# Patient Record
Sex: FEMALE | Race: WHITE | NOT HISPANIC OR LATINO
[De-identification: names, ages, dates, MRNs, and addresses within clinical notes are randomized per-mention and may not be internally consistent; named-entity substitution may affect disease eponyms.]

---

## 2017-02-15 ENCOUNTER — APPOINTMENT (OUTPATIENT)
Dept: OPHTHALMOLOGY | Facility: CLINIC | Age: 82
End: 2017-02-15

## 2017-02-15 DIAGNOSIS — H35.3122 NONEXUDATIVE AGE-RELATED MACULAR DEGENERATION, LEFT EYE, INTERMEDIATE DRY STAGE: ICD-10-CM

## 2017-02-15 DIAGNOSIS — H40.1131 PRIMARY OPEN-ANGLE GLAUCOMA, BILATERAL, MILD STAGE: ICD-10-CM

## 2017-02-21 PROBLEM — H35.3122 NONEXUDATIVE AGE-RELATED MACULAR DEGENERATION, LEFT EYE, INTERMEDIATE DRY STAGE: Status: ACTIVE | Noted: 2017-02-21

## 2017-04-07 ENCOUNTER — RX RENEWAL (OUTPATIENT)
Age: 82
End: 2017-04-07

## 2017-07-28 ENCOUNTER — RX RENEWAL (OUTPATIENT)
Age: 82
End: 2017-07-28

## 2017-08-15 ENCOUNTER — APPOINTMENT (OUTPATIENT)
Dept: GERIATRICS | Facility: CLINIC | Age: 82
End: 2017-08-15
Payer: MEDICARE

## 2017-08-15 VITALS
HEIGHT: 62 IN | WEIGHT: 115.05 LBS | OXYGEN SATURATION: 97 % | BODY MASS INDEX: 21.17 KG/M2 | HEART RATE: 79 BPM | DIASTOLIC BLOOD PRESSURE: 62 MMHG | RESPIRATION RATE: 16 BRPM | SYSTOLIC BLOOD PRESSURE: 142 MMHG

## 2017-08-15 DIAGNOSIS — R21 RASH AND OTHER NONSPECIFIC SKIN ERUPTION: ICD-10-CM

## 2017-08-15 PROCEDURE — 99214 OFFICE O/P EST MOD 30 MIN: CPT | Mod: GC

## 2017-08-15 RX ORDER — LATANOPROST/PF 0.005 %
0.01 DROPS OPHTHALMIC (EYE)
Qty: 2.5 | Refills: 3 | Status: ACTIVE | COMMUNITY
Start: 2017-08-15

## 2017-08-23 ENCOUNTER — APPOINTMENT (OUTPATIENT)
Dept: GERIATRICS | Facility: CLINIC | Age: 82
End: 2017-08-23
Payer: MEDICARE

## 2017-08-23 VITALS
OXYGEN SATURATION: 96 % | BODY MASS INDEX: 21 KG/M2 | HEIGHT: 62 IN | TEMPERATURE: 98 F | WEIGHT: 114.13 LBS | SYSTOLIC BLOOD PRESSURE: 120 MMHG | HEART RATE: 71 BPM | RESPIRATION RATE: 15 BRPM | DIASTOLIC BLOOD PRESSURE: 60 MMHG

## 2017-08-23 PROCEDURE — 99213 OFFICE O/P EST LOW 20 MIN: CPT

## 2017-08-28 LAB
APPEARANCE: CLEAR
BILIRUBIN URINE: NEGATIVE
BLOOD URINE: NEGATIVE
COLOR: YELLOW
GLUCOSE QUALITATIVE U: NORMAL MG/DL
KETONES URINE: NEGATIVE
LEUKOCYTE ESTERASE URINE: NEGATIVE
NITRITE URINE: NEGATIVE
PH URINE: 6
PROTEIN URINE: NEGATIVE MG/DL
SPECIFIC GRAVITY URINE: 1.01
UROBILINOGEN URINE: NORMAL MG/DL

## 2017-08-30 ENCOUNTER — APPOINTMENT (OUTPATIENT)
Dept: GERIATRICS | Facility: CLINIC | Age: 82
End: 2017-08-30
Payer: MEDICARE

## 2017-08-30 VITALS
HEART RATE: 73 BPM | OXYGEN SATURATION: 97 % | SYSTOLIC BLOOD PRESSURE: 120 MMHG | DIASTOLIC BLOOD PRESSURE: 60 MMHG | HEIGHT: 62 IN | RESPIRATION RATE: 15 BRPM | BODY MASS INDEX: 21.35 KG/M2 | TEMPERATURE: 98.3 F | WEIGHT: 116 LBS

## 2017-08-30 DIAGNOSIS — E55.9 VITAMIN D DEFICIENCY, UNSPECIFIED: ICD-10-CM

## 2017-08-30 DIAGNOSIS — S81.802D UNSPECIFIED OPEN WOUND, LEFT LOWER LEG, SUBSEQUENT ENCOUNTER: ICD-10-CM

## 2017-08-30 PROCEDURE — 99214 OFFICE O/P EST MOD 30 MIN: CPT

## 2017-09-29 ENCOUNTER — APPOINTMENT (OUTPATIENT)
Dept: GERIATRICS | Facility: CLINIC | Age: 82
End: 2017-09-29
Payer: MEDICARE

## 2017-09-29 VITALS
OXYGEN SATURATION: 97 % | BODY MASS INDEX: 22.1 KG/M2 | RESPIRATION RATE: 15 BRPM | HEIGHT: 60.2 IN | SYSTOLIC BLOOD PRESSURE: 150 MMHG | WEIGHT: 114.05 LBS | DIASTOLIC BLOOD PRESSURE: 78 MMHG

## 2017-09-29 DIAGNOSIS — F43.20 ADJUSTMENT DISORDER, UNSPECIFIED: ICD-10-CM

## 2017-09-29 DIAGNOSIS — L03.116 CELLULITIS OF LEFT LOWER LIMB: ICD-10-CM

## 2017-09-29 PROCEDURE — 90662 IIV NO PRSV INCREASED AG IM: CPT

## 2017-09-29 PROCEDURE — G0008: CPT

## 2017-09-29 PROCEDURE — 99214 OFFICE O/P EST MOD 30 MIN: CPT

## 2017-10-06 ENCOUNTER — APPOINTMENT (OUTPATIENT)
Dept: DERMATOLOGY | Facility: CLINIC | Age: 82
End: 2017-10-06
Payer: MEDICARE

## 2017-10-06 VITALS
SYSTOLIC BLOOD PRESSURE: 120 MMHG | HEIGHT: 64 IN | WEIGHT: 118 LBS | BODY MASS INDEX: 20.14 KG/M2 | DIASTOLIC BLOOD PRESSURE: 72 MMHG

## 2017-10-06 DIAGNOSIS — L85.3 XEROSIS CUTIS: ICD-10-CM

## 2017-10-06 DIAGNOSIS — Z86.69 PERSONAL HISTORY OF OTHER DISEASES OF THE NERVOUS SYSTEM AND SENSE ORGANS: ICD-10-CM

## 2017-10-06 DIAGNOSIS — Z91.89 OTHER SPECIFIED PERSONAL RISK FACTORS, NOT ELSEWHERE CLASSIFIED: ICD-10-CM

## 2017-10-06 DIAGNOSIS — Z56.0 UNEMPLOYMENT, UNSPECIFIED: ICD-10-CM

## 2017-10-06 DIAGNOSIS — H54.7 UNSPECIFIED VISUAL LOSS: ICD-10-CM

## 2017-10-06 PROCEDURE — 99203 OFFICE O/P NEW LOW 30 MIN: CPT | Mod: GC

## 2017-10-06 RX ORDER — LATANOPROST 50 UG/ML
0.01 SOLUTION OPHTHALMIC
Refills: 0 | Status: ACTIVE | COMMUNITY

## 2017-10-06 SDOH — ECONOMIC STABILITY - INCOME SECURITY: UNEMPLOYMENT, UNSPECIFIED: Z56.0

## 2017-10-19 ENCOUNTER — RX RENEWAL (OUTPATIENT)
Age: 82
End: 2017-10-19

## 2017-10-20 ENCOUNTER — RX RENEWAL (OUTPATIENT)
Age: 82
End: 2017-10-20

## 2017-10-27 ENCOUNTER — RX RENEWAL (OUTPATIENT)
Age: 82
End: 2017-10-27

## 2017-11-10 ENCOUNTER — APPOINTMENT (OUTPATIENT)
Dept: DERMATOLOGY | Facility: CLINIC | Age: 82
End: 2017-11-10
Payer: MEDICARE

## 2017-11-10 VITALS — DIASTOLIC BLOOD PRESSURE: 70 MMHG | SYSTOLIC BLOOD PRESSURE: 128 MMHG

## 2017-11-10 DIAGNOSIS — L30.9 DERMATITIS, UNSPECIFIED: ICD-10-CM

## 2017-11-10 DIAGNOSIS — R23.8 OTHER SKIN CHANGES: ICD-10-CM

## 2017-11-10 PROCEDURE — 99213 OFFICE O/P EST LOW 20 MIN: CPT | Mod: GC

## 2017-11-12 PROBLEM — R23.8 WOUND OF SKIN: Status: ACTIVE | Noted: 2017-10-06

## 2017-11-12 PROBLEM — L30.9 DERMATITIS: Status: ACTIVE | Noted: 2017-11-10

## 2017-12-08 ENCOUNTER — APPOINTMENT (OUTPATIENT)
Dept: DERMATOLOGY | Facility: CLINIC | Age: 82
End: 2017-12-08

## 2018-01-16 ENCOUNTER — RX RENEWAL (OUTPATIENT)
Age: 83
End: 2018-01-16

## 2018-05-21 ENCOUNTER — RX RENEWAL (OUTPATIENT)
Age: 83
End: 2018-05-21

## 2018-07-27 PROBLEM — H40.1131 PRIMARY OPEN ANGLE GLAUCOMA OF BOTH EYES, MILD STAGE: Status: ACTIVE | Noted: 2017-02-21

## 2018-09-06 ENCOUNTER — RX RENEWAL (OUTPATIENT)
Age: 83
End: 2018-09-06

## 2018-11-26 ENCOUNTER — APPOINTMENT (OUTPATIENT)
Dept: GERIATRICS | Facility: CLINIC | Age: 83
End: 2018-11-26
Payer: MEDICARE

## 2018-11-26 ENCOUNTER — MED ADMIN CHARGE (OUTPATIENT)
Age: 83
End: 2018-11-26

## 2018-11-26 PROCEDURE — G0008: CPT

## 2018-11-26 PROCEDURE — 90662 IIV NO PRSV INCREASED AG IM: CPT

## 2018-12-11 ENCOUNTER — RX RENEWAL (OUTPATIENT)
Age: 83
End: 2018-12-11

## 2019-04-26 ENCOUNTER — APPOINTMENT (OUTPATIENT)
Dept: GERIATRICS | Facility: CLINIC | Age: 84
End: 2019-04-26
Payer: MEDICARE

## 2019-04-26 VITALS
SYSTOLIC BLOOD PRESSURE: 132 MMHG | OXYGEN SATURATION: 97 % | RESPIRATION RATE: 18 BRPM | HEIGHT: 64.8 IN | HEART RATE: 85 BPM | DIASTOLIC BLOOD PRESSURE: 70 MMHG

## 2019-04-26 PROCEDURE — 99215 OFFICE O/P EST HI 40 MIN: CPT

## 2019-04-26 NOTE — HISTORY OF PRESENT ILLNESS
[Moderate] : Stage: Moderate [Stable] : Status: Stable [Memory Lapses Or Loss] : stable memory impairment [Patient Observed To Be Agitated] : stable agitation [Hostility Toward Caregivers] : denies aggression [Sleep Disturbances] : denies sleep disturbances [] : denies wandering [Fixed Beliefs Contradicted By Reality (Delusions)] : denies delusions [Difficulty Finding Desired Words] : denies difficulty finding desired words [1] : 2) Feeling down, depressed, or hopeless for several days [PHQ-2 Score ___] : PHQ-2 Score [unfilled] [FreeTextEntry1] : 89yo woman who returns with her daughter because she is thinking of getting PT " because I don't walk well, but I am afraid to go to therapy because 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  She has never fallen but refuses to use the walker which she has at home. She holds on to people. She lives at [facility].  She has been otherwise  stable, states she is not as depressed and remains quite independent, with mild MCI . No other complaints.\par Three children share HCP. Cheryl Flores and Ariel ( [phone]- daughter Cheryl)\par

## 2019-04-26 NOTE — END OF VISIT
[FreeTextEntry3] : 89yo woman here with her daughter Cheryl to get prescription ofr PT. 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  She has never fallen but refuses to use the walker which she has at home. She holds on to people. . No other complaints.\par Three children share HCP. Cheryl Flores and Ariel ( [phone]- daughter Cheryl)\par PE unremarkable,\par Plan: PT referral, bloods, no change in meds

## 2019-04-26 NOTE — SOCIAL HISTORY
[No falls in past year] : Patient reported no falls in the past year [FreeTextEntry1] : Has 24/7 for the past 6 years [FreeTextEntry2] : good [FreeTextEntry3] : None [FreeTextEntry4] : poor [de-identified] : Needs help inl ADL, does shower and able to make simple meals [de-identified] : No housekeeping, not using transportation, has not done finances since  passed away 6 years ago

## 2019-04-26 NOTE — PHYSICAL EXAM
[Normal Oral Mucosa] : normal oral mucosa [No Oral Pallor] : no oral pallor [No Oral Cyanosis] : no oral cyanosis [Oropharynx] : The oropharynx was normal [Outer Ear] : the ears and nose were normal in appearance [Neck Appearance] : the appearance of the neck was normal [Neck Cervical Mass (___cm)] : no neck mass was observed [Jugular Venous Distention Increased] : there was no jugular-venous distention [Thyroid Diffuse Enlargement] : the thyroid was not enlarged [Thyroid Nodule] : there were no palpable thyroid nodules [Heart Rate And Rhythm] : heart rate was normal and rhythm regular [Heart Sounds] : normal S1 and S2 [Auscultation Breath Sounds / Voice Sounds] : lungs were clear to auscultation bilaterally [Heart Sounds Pericardial Friction Rub] : no pericardial rub [Heart Sounds Gallop] : no gallops [Murmurs] : no murmurs [Bowel Sounds] : normal bowel sounds [Abdomen Soft] : soft [Abdomen Tenderness] : non-tender [Abdomen Mass (___ Cm)] : no abdominal mass palpated [No CVA Tenderness] : no ~M costovertebral angle tenderness [No Spinal Tenderness] : no spinal tenderness [Abnormal Walk] : normal gait [Nail Clubbing] : no clubbing  or cyanosis of the fingernails [Musculoskeletal - Swelling] : no joint swelling seen [Motor Tone] : muscle strength and tone were normal [Skin Color & Pigmentation] : normal skin color and pigmentation [Skin Turgor] : normal skin turgor [Deep Tendon Reflexes (DTR)] : deep tendon reflexes were 2+ and symmetric [] : no rash [No Focal Deficits] : no focal deficits [Sensation] : the sensory exam was normal to light touch and pinprick

## 2019-04-29 LAB
ALBUMIN SERPL ELPH-MCNC: 4.1 G/DL
ALP BLD-CCNC: 55 U/L
ALT SERPL-CCNC: 14 U/L
ANION GAP SERPL CALC-SCNC: 11 MMOL/L
AST SERPL-CCNC: 22 U/L
BASOPHILS # BLD AUTO: 0.05 K/UL
BASOPHILS NFR BLD AUTO: 0.8 %
BILIRUB SERPL-MCNC: 0.5 MG/DL
BUN SERPL-MCNC: 20 MG/DL
CALCIUM SERPL-MCNC: 9.4 MG/DL
CHLORIDE SERPL-SCNC: 93 MMOL/L
CO2 SERPL-SCNC: 25 MMOL/L
CREAT SERPL-MCNC: 0.75 MG/DL
EOSINOPHIL # BLD AUTO: 0.06 K/UL
EOSINOPHIL NFR BLD AUTO: 0.9 %
GLUCOSE SERPL-MCNC: 112 MG/DL
HCT VFR BLD CALC: 39.9 %
HGB BLD-MCNC: 12.9 G/DL
IMM GRANULOCYTES NFR BLD AUTO: 0.3 %
LYMPHOCYTES # BLD AUTO: 1.18 K/UL
LYMPHOCYTES NFR BLD AUTO: 18.5 %
MAN DIFF?: NORMAL
MCHC RBC-ENTMCNC: 30.4 PG
MCHC RBC-ENTMCNC: 32.3 GM/DL
MCV RBC AUTO: 94.1 FL
MONOCYTES # BLD AUTO: 0.57 K/UL
MONOCYTES NFR BLD AUTO: 8.9 %
NEUTROPHILS # BLD AUTO: 4.5 K/UL
NEUTROPHILS NFR BLD AUTO: 70.6 %
PLATELET # BLD AUTO: 190 K/UL
POTASSIUM SERPL-SCNC: 4.3 MMOL/L
PROT SERPL-MCNC: 6.8 G/DL
RBC # BLD: 4.24 M/UL
RBC # FLD: 13.2 %
SODIUM SERPL-SCNC: 129 MMOL/L
TSH SERPL-ACNC: 7.7 UIU/ML
WBC # FLD AUTO: 6.38 K/UL

## 2019-06-28 ENCOUNTER — RX RENEWAL (OUTPATIENT)
Age: 84
End: 2019-06-28

## 2019-08-21 ENCOUNTER — TRANSCRIPTION ENCOUNTER (OUTPATIENT)
Age: 84
End: 2019-08-21

## 2019-08-21 ENCOUNTER — APPOINTMENT (OUTPATIENT)
Dept: GERIATRICS | Facility: CLINIC | Age: 84
End: 2019-08-21
Payer: MEDICARE

## 2019-08-21 VITALS
WEIGHT: 115 LBS | HEART RATE: 78 BPM | RESPIRATION RATE: 18 BRPM | TEMPERATURE: 98.4 F | SYSTOLIC BLOOD PRESSURE: 142 MMHG | OXYGEN SATURATION: 97 % | BODY MASS INDEX: 19.63 KG/M2 | DIASTOLIC BLOOD PRESSURE: 80 MMHG | HEIGHT: 64 IN

## 2019-08-21 DIAGNOSIS — Z91.81 HISTORY OF FALLING: ICD-10-CM

## 2019-08-21 PROCEDURE — 99215 OFFICE O/P EST HI 40 MIN: CPT | Mod: GC

## 2019-08-21 NOTE — PHYSICAL EXAM
[General Appearance - Alert] : alert [General Appearance - In No Acute Distress] : in no acute distress [Sclera] : the sclera and conjunctiva were normal [PERRL With Normal Accommodation] : pupils were equal in size, round, and reactive to light [Extraocular Movements] : extraocular movements were intact [Normal Oral Mucosa] : normal oral mucosa [No Oral Cyanosis] : no oral cyanosis [No Oral Pallor] : no oral pallor [Oropharynx] : The oropharynx was normal [Both Tympanic Membranes Were Examined] : both tympanic membranes were normal [Outer Ear] : the ears and nose were normal in appearance [Jugular Venous Distention Increased] : there was no jugular-venous distention [Neck Appearance] : the appearance of the neck was normal [Neck Cervical Mass (___cm)] : no neck mass was observed [Thyroid Nodule] : there were no palpable thyroid nodules [Thyroid Diffuse Enlargement] : the thyroid was not enlarged [Auscultation Breath Sounds / Voice Sounds] : lungs were clear to auscultation bilaterally [Heart Rate And Rhythm] : heart rate was normal and rhythm regular [Heart Sounds] : normal S1 and S2 [Heart Sounds Gallop] : no gallops [Murmurs] : no murmurs [Heart Sounds Pericardial Friction Rub] : no pericardial rub [Bowel Sounds] : normal bowel sounds [Abdomen Soft] : soft [Abdomen Tenderness] : non-tender [No Spinal Tenderness] : no spinal tenderness [Abdomen Mass (___ Cm)] : no abdominal mass palpated [No CVA Tenderness] : no ~M costovertebral angle tenderness [Abnormal Walk] : normal gait [Nail Clubbing] : no clubbing  or cyanosis of the fingernails [Motor Tone] : muscle strength and tone were normal [Skin Turgor] : normal skin turgor [Skin Color & Pigmentation] : normal skin color and pigmentation [] : no rash [Deep Tendon Reflexes (DTR)] : deep tendon reflexes were 2+ and symmetric [Sensation] : the sensory exam was normal to light touch and pinprick [No Focal Deficits] : no focal deficits [FreeTextEntry1] : alert

## 2019-08-21 NOTE — HISTORY OF PRESENT ILLNESS
[Moderate] : Stage: Moderate [Stable] : Status: Stable [Hostility Toward Caregivers] : denies aggression [Patient Observed To Be Agitated] : stable agitation [Memory Lapses Or Loss] : stable memory impairment [Sleep Disturbances] : denies sleep disturbances [Fixed Beliefs Contradicted By Reality (Delusions)] : denies delusions [] : denies wandering [Difficulty Finding Desired Words] : denies difficulty finding desired words [PHQ-2 Score ___] : PHQ-2 Score [unfilled] [1] : 2) Feeling down, depressed, or hopeless for several days [FreeTextEntry1] : 89 yo woman who returns with her son Ariel. Pt had a possible unwitnessed fall. Pt complains of right wrist pain over the past couple of days, she did not take any medications for pain. History obtained from son Ariel as pt  provides limited hx due to dementia. Pt was getting PT at home twice a week. Pt walks with a walker. \par \par Pt also complains of intermittent bilateral ear pain and was seen by an ENT who removed ear wax at that time. Denies any other pain. \par \par Three children share HCP. Cheryl Flores and Ariel ([phone]- daughter Cheryl)\par

## 2019-08-21 NOTE — SOCIAL HISTORY
[No falls in past year] : Patient reported no falls in the past year [FreeTextEntry2] : good [FreeTextEntry1] : Has 24/7 for the past 6 years [FreeTextEntry3] : None [FreeTextEntry4] : poor [de-identified] : Needs help inl ADL, does shower and able to make simple meals [de-identified] : No housekeeping, not using transportation, has not done finances since  passed away 6 years ago

## 2019-08-21 NOTE — REASON FOR VISIT
[Follow-Up] : a follow-up visit [Family Member] : family member [FreeTextEntry1] : s/p fall, complained of right wrist pain

## 2019-08-21 NOTE — REVIEW OF SYSTEMS
[As Noted in HPI] : as noted in HPI [Negative] : Heme/Lymph [Earache] : earache [FreeTextEntry9] : right wrist pain

## 2019-08-21 NOTE — END OF VISIT
[] : Fellow [FreeTextEntry3] : 89 yo woman brought by son Ariel, because possible unwitnessed fall 2 days ago.Has been complaining of right wrist pain over the past couple of days.  Also had intermittent bilateral ear pain and was seen by an ENT who removed ear wax at that time. Denies any other pain. Ariel adds that she has been more confused and agitated since Cheryl ordonez a spontaneous arachnoid bleed and has been operated- successfully- at [facility] 3 weeks ago. Patient managed to find out and to find telephone number of [facility] (!!!...) [facility] she has been calling to obtain news.\par On PE Rt wrist obviously swollen and disformed, remainder exam normal\par Plan: Immediate writs Xray, probable casting. No need for further evaluation treatment of bilateral ear "buzzing, which seems to be resolving .\par Long discussion with Ariel and wife Esther re management of increased confusion. Since it appears improved, I am reluctant to prescribe increase in Quietapine, which may increase confusion and risk of falls.i

## 2019-08-22 ENCOUNTER — APPOINTMENT (OUTPATIENT)
Dept: ORTHOPEDIC SURGERY | Facility: CLINIC | Age: 84
End: 2019-08-22

## 2019-08-26 ENCOUNTER — EMERGENCY (EMERGENCY)
Facility: HOSPITAL | Age: 84
LOS: 1 days | Discharge: HOME | End: 2019-08-26
Attending: EMERGENCY MEDICINE | Admitting: EMERGENCY MEDICINE
Payer: MEDICARE

## 2019-08-26 VITALS
RESPIRATION RATE: 16 BRPM | DIASTOLIC BLOOD PRESSURE: 88 MMHG | OXYGEN SATURATION: 97 % | HEART RATE: 82 BPM | TEMPERATURE: 98 F | SYSTOLIC BLOOD PRESSURE: 202 MMHG

## 2019-08-26 VITALS
OXYGEN SATURATION: 97 % | SYSTOLIC BLOOD PRESSURE: 193 MMHG | DIASTOLIC BLOOD PRESSURE: 105 MMHG | HEART RATE: 82 BPM | TEMPERATURE: 98 F | RESPIRATION RATE: 16 BRPM

## 2019-08-26 LAB
BASOPHILS # BLD AUTO: 0.04 K/UL — SIGNIFICANT CHANGE UP (ref 0–0.2)
BASOPHILS NFR BLD AUTO: 0.3 % — SIGNIFICANT CHANGE UP (ref 0–2)
EOSINOPHIL # BLD AUTO: 0.1 K/UL — SIGNIFICANT CHANGE UP (ref 0–0.5)
EOSINOPHIL NFR BLD AUTO: 0.8 % — SIGNIFICANT CHANGE UP (ref 0–6)
HCT VFR BLD CALC: 34.4 % — LOW (ref 34.5–45)
HGB BLD-MCNC: 11.5 G/DL — SIGNIFICANT CHANGE UP (ref 11.5–15.5)
IMM GRANULOCYTES NFR BLD AUTO: 0.4 % — SIGNIFICANT CHANGE UP (ref 0–1.5)
LYMPHOCYTES # BLD AUTO: 1.54 K/UL — SIGNIFICANT CHANGE UP (ref 1–3.3)
LYMPHOCYTES # BLD AUTO: 12.6 % — LOW (ref 13–44)
MCHC RBC-ENTMCNC: 29.7 PG — SIGNIFICANT CHANGE UP (ref 27–34)
MCHC RBC-ENTMCNC: 33.4 % — SIGNIFICANT CHANGE UP (ref 32–36)
MCV RBC AUTO: 88.9 FL — SIGNIFICANT CHANGE UP (ref 80–100)
MONOCYTES # BLD AUTO: 1.07 K/UL — HIGH (ref 0–0.9)
MONOCYTES NFR BLD AUTO: 8.8 % — SIGNIFICANT CHANGE UP (ref 2–14)
NEUTROPHILS # BLD AUTO: 9.42 K/UL — HIGH (ref 1.8–7.4)
NEUTROPHILS NFR BLD AUTO: 77.1 % — HIGH (ref 43–77)
NRBC # FLD: 0 K/UL — SIGNIFICANT CHANGE UP (ref 0–0)
PLATELET # BLD AUTO: 208 K/UL — SIGNIFICANT CHANGE UP (ref 150–400)
PMV BLD: 9.3 FL — SIGNIFICANT CHANGE UP (ref 7–13)
RBC # BLD: 3.87 M/UL — SIGNIFICANT CHANGE UP (ref 3.8–5.2)
RBC # FLD: 12.6 % — SIGNIFICANT CHANGE UP (ref 10.3–14.5)
WBC # BLD: 12.22 K/UL — HIGH (ref 3.8–10.5)
WBC # FLD AUTO: 12.22 K/UL — HIGH (ref 3.8–10.5)

## 2019-08-26 PROCEDURE — 70450 CT HEAD/BRAIN W/O DYE: CPT | Mod: 26

## 2019-08-26 PROCEDURE — 72125 CT NECK SPINE W/O DYE: CPT | Mod: 26

## 2019-08-26 PROCEDURE — 99285 EMERGENCY DEPT VISIT HI MDM: CPT | Mod: GC

## 2019-08-26 NOTE — ED ADULT NURSE NOTE - OBJECTIVE STATEMENT
pt received alert and oriented x3. pt is s/p unwitnessed falled. -LOC, + hitting head. pt unsure of what happened. respirations equal and unlabored.lump noted in back of head with scant blood controlled in dressing. perlla. skin intact. nsr on cm. 20 g placed in left forearm. Call bell in reach, warm blanket provided, bed in lowest position, side rails up x2,safety maintained. will continue to monitor.

## 2019-08-26 NOTE — ED ADULT TRIAGE NOTE - CHIEF COMPLAINT QUOTE
pt BIBA from [employer] [employer].  pt was unwitnesed fall.  pt c/o dizziness, prior to fall.  c/o pain to back of head where there is a lump noted.  pt denies blood thinner use

## 2019-08-26 NOTE — ED ADULT NURSE NOTE - CHIEF COMPLAINT QUOTE
pt BIBA from [facility].  pt was unwitnesed fall.  pt c/o dizziness, prior to fall.  c/o pain to back of head where there is a lump noted.  pt denies blood thinner use

## 2019-08-27 LAB
ALBUMIN SERPL ELPH-MCNC: 3.5 G/DL — SIGNIFICANT CHANGE UP (ref 3.3–5)
ALP SERPL-CCNC: 63 U/L — SIGNIFICANT CHANGE UP (ref 40–120)
ALT FLD-CCNC: 10 U/L — SIGNIFICANT CHANGE UP (ref 4–33)
ANION GAP SERPL CALC-SCNC: 11 MMO/L — SIGNIFICANT CHANGE UP (ref 7–14)
APPEARANCE UR: CLEAR — SIGNIFICANT CHANGE UP
AST SERPL-CCNC: 19 U/L — SIGNIFICANT CHANGE UP (ref 4–32)
BILIRUB SERPL-MCNC: 0.4 MG/DL — SIGNIFICANT CHANGE UP (ref 0.2–1.2)
BILIRUB UR-MCNC: NEGATIVE — SIGNIFICANT CHANGE UP
BLOOD UR QL VISUAL: NEGATIVE — SIGNIFICANT CHANGE UP
BUN SERPL-MCNC: 21 MG/DL — SIGNIFICANT CHANGE UP (ref 7–23)
CALCIUM SERPL-MCNC: 8.6 MG/DL — SIGNIFICANT CHANGE UP (ref 8.4–10.5)
CHLORIDE SERPL-SCNC: 91 MMOL/L — LOW (ref 98–107)
CO2 SERPL-SCNC: 24 MMOL/L — SIGNIFICANT CHANGE UP (ref 22–31)
COLOR SPEC: SIGNIFICANT CHANGE UP
CREAT SERPL-MCNC: 0.62 MG/DL — SIGNIFICANT CHANGE UP (ref 0.5–1.3)
GLUCOSE SERPL-MCNC: 121 MG/DL — HIGH (ref 70–99)
GLUCOSE UR-MCNC: 30 — SIGNIFICANT CHANGE UP
KETONES UR-MCNC: NEGATIVE — SIGNIFICANT CHANGE UP
LEUKOCYTE ESTERASE UR-ACNC: SIGNIFICANT CHANGE UP
MAGNESIUM SERPL-MCNC: 2 MG/DL — SIGNIFICANT CHANGE UP (ref 1.6–2.6)
NITRITE UR-MCNC: NEGATIVE — SIGNIFICANT CHANGE UP
PH UR: 7 — SIGNIFICANT CHANGE UP (ref 5–8)
POTASSIUM SERPL-MCNC: 4.1 MMOL/L — SIGNIFICANT CHANGE UP (ref 3.5–5.3)
POTASSIUM SERPL-SCNC: 4.1 MMOL/L — SIGNIFICANT CHANGE UP (ref 3.5–5.3)
PROT SERPL-MCNC: 6.9 G/DL — SIGNIFICANT CHANGE UP (ref 6–8.3)
PROT UR-MCNC: NEGATIVE — SIGNIFICANT CHANGE UP
RBC CASTS # UR COMP ASSIST: SIGNIFICANT CHANGE UP (ref 0–?)
SODIUM SERPL-SCNC: 126 MMOL/L — LOW (ref 135–145)
SP GR SPEC: 1.02 — SIGNIFICANT CHANGE UP (ref 1–1.04)
TSH SERPL-MCNC: 7.45 UIU/ML — HIGH (ref 0.27–4.2)
UROBILINOGEN FLD QL: NORMAL — SIGNIFICANT CHANGE UP
WBC UR QL: SIGNIFICANT CHANGE UP (ref 0–?)

## 2019-08-27 PROCEDURE — 71045 X-RAY EXAM CHEST 1 VIEW: CPT | Mod: 26

## 2019-08-27 PROCEDURE — 73521 X-RAY EXAM HIPS BI 2 VIEWS: CPT | Mod: 26

## 2019-08-27 NOTE — ED PROVIDER NOTE - ATTENDING CONTRIBUTION TO CARE
I have personally performed a face to face bedside history and physical examination of this patient. I have discussed the history, examination, review of systems, assessment and plan of management with the resident. I have reviewed the electronic medical record and amended it to reflect my history, review of systems, physical exam, assessment and plan.    89F presenting after an unwitnessed fall. patient reports falling while trying to walk in her room. denies dizziness, chest pain before fall. not on any blood thinners. no fever, difficulty breathing, nausea, vomiting, pain or burning with urination, pain or swelling of lower extremities.  VSS.  Exam alert, aaox2, occipital hematoma (unclear if lac, hair matted w/ dried blood), no midline c/t/l spine ttp, lungs clear, pelvis stable, moving all extremities.  Unclear if syncope vs. mechanical fall.  Will obtain ekg, labs, ct, x-rays.  Dispo pending.

## 2019-08-27 NOTE — ED PROVIDER NOTE - CLINICAL SUMMARY MEDICAL DECISION MAKING FREE TEXT BOX
89F presenting after unwitnessed fall. denying any AC use, chest pain or difficulty breathing but unsure why she fell. syncope vs unwitnessed fall. plan for labs, CT, cxr. will reassess.

## 2019-08-27 NOTE — ED PROVIDER NOTE - PHYSICAL EXAMINATION
general: well appearing female, no acute distress   heent: occipital hematoma and abrasion, PERRL  respiratory: normal work of breathing, lungs clear to auscultation bilaterally   cardiac: regular rate and rhythm   abdomen: soft, non-tender   msk: no midline spinal tenderness   skin: warm, dry  neuro: A&Ox3

## 2019-08-27 NOTE — ED PROVIDER NOTE - PROGRESS NOTE DETAILS
updated patient and family on results including low sodium. offered admission but as discussed earlier, family wishes to bring patient home. patient still mentating appropriately. will discharge. - resident Tarun Head

## 2019-08-27 NOTE — ED PROVIDER NOTE - OBJECTIVE STATEMENT
89F presenting after an unwitnessed fall. patient reports falling while trying to walk in her room. denies dizziness, chest pain before fall. not on any blood thinners. no fever, difficulty breathing, nausea, vomiting, pain or burning with urination, pain or swelling of lower extremities.

## 2019-10-16 ENCOUNTER — APPOINTMENT (OUTPATIENT)
Dept: GERIATRICS | Facility: CLINIC | Age: 84
End: 2019-10-16
Payer: MEDICARE

## 2019-10-16 VITALS
BODY MASS INDEX: 19.46 KG/M2 | TEMPERATURE: 98.2 F | OXYGEN SATURATION: 95 % | DIASTOLIC BLOOD PRESSURE: 80 MMHG | HEIGHT: 64 IN | WEIGHT: 114 LBS | RESPIRATION RATE: 18 BRPM | SYSTOLIC BLOOD PRESSURE: 122 MMHG | HEART RATE: 74 BPM

## 2019-10-16 DIAGNOSIS — H35.3210 EXUDATIVE AGE-RELATED MACULAR DEGENERATION, RIGHT EYE, STAGE UNSPECIFIED: ICD-10-CM

## 2019-10-16 DIAGNOSIS — G31.84 MILD COGNITIVE IMPAIRMENT, SO STATED: ICD-10-CM

## 2019-10-16 PROCEDURE — 99215 OFFICE O/P EST HI 40 MIN: CPT | Mod: GC

## 2019-10-16 PROCEDURE — G0008: CPT

## 2019-10-16 PROCEDURE — 90662 IIV NO PRSV INCREASED AG IM: CPT

## 2019-10-17 NOTE — END OF VISIT
[FreeTextEntry3] : 89 yo woman who is brought by her caregiver Epifanio (  [phone]; [phone]). She was initially coming for a routine flu shot, but just had a skin tear on posterior aspect left leg, due to itching on her skin, which she scratches at night.\par On exam, patient looks well, in no distress, cheerful and thrilled with attention ! Lungs clear , has superficial left posterior leg, no signs of infection.\par  Plan: No change in meds, dress left leg , tecah caregiver with dressing \par \par Pt also complains of intermittent bilateral ear pain and was seen by an ENT who removed ear wax at that time. Denies any other pain. \par \par Three children share HCP. Cheryl Flores and Ariel ([phone]- daughter Cheryl)

## 2019-10-17 NOTE — REVIEW OF SYSTEMS
[As Noted in HPI] : as noted in HPI [Negative] : Heme/Lymph [FreeTextEntry2] : No change, here for flu shot

## 2019-10-17 NOTE — PHYSICAL EXAM
[General Appearance - Alert] : alert [General Appearance - In No Acute Distress] : in no acute distress [General Appearance - Well Nourished] : well nourished [General Appearance - Well Developed] : well developed [General Appearance - Well-Appearing] : healthy appearing [Sclera] : the sclera and conjunctiva were normal [PERRL With Normal Accommodation] : pupils were equal in size, round, and reactive to light [Normal Oral Mucosa] : normal oral mucosa [No Oral Pallor] : no oral pallor [No Oral Cyanosis] : no oral cyanosis [Outer Ear] : the ears and nose were normal in appearance [Hearing Threshold Finger Rub Not [place]] : hearing was normal [Examination Of The Oral Cavity] : the lips and gums were normal [Both Tympanic Membranes Were Examined] : both tympanic membranes were normal [Neck Appearance] : the appearance of the neck was normal [Neck Cervical Mass (___cm)] : no neck mass was observed [Jugular Venous Distention Increased] : there was no jugular-venous distention [] : no respiratory distress [Respiration, Rhythm And Depth] : normal respiratory rhythm and effort [Exaggerated Use Of Accessory Muscles For Inspiration] : no accessory muscle use [Auscultation Breath Sounds / Voice Sounds] : lungs were clear to auscultation bilaterally [Apical Impulse] : the apical impulse was normal [Heart Rate And Rhythm] : heart rate was normal and rhythm regular [Heart Sounds] : normal S1 and S2 [Full Pulse] : the pedal pulses are present [Edema] : there was no peripheral edema [Bowel Sounds] : normal bowel sounds [Abdomen Soft] : soft [Abdomen Tenderness] : non-tender [Cervical Lymph Nodes Enlarged Posterior Bilaterally] : posterior cervical [Cervical Lymph Nodes Enlarged Anterior Bilaterally] : anterior cervical [Supraclavicular Lymph Nodes Enlarged Bilaterally] : supraclavicular [Axillary Lymph Nodes Enlarged Bilaterally] : axillary [No CVA Tenderness] : no ~M costovertebral angle tenderness [Abnormal Walk] : normal gait [Nail Clubbing] : no clubbing  or cyanosis of the fingernails [Musculoskeletal - Swelling] : no joint swelling seen [Sensation] : the sensory exam was normal to light touch and pinprick [Motor Exam] : the motor exam was normal [No Focal Deficits] : no focal deficits [Affect] : the affect was normal [Mood] : the mood was normal [Memory Recent] : recent memory was not impaired [Memory Remote] : remote memory was not impaired [FreeTextEntry1] : Pt. appears happy, very delightful and talkative.

## 2019-10-17 NOTE — HISTORY OF PRESENT ILLNESS
[Moderate] : Stage: Moderate [Stable] : Status: Stable [Memory Lapses Or Loss] : stable memory impairment [Patient Observed To Be Agitated] : stable agitation [Hostility Toward Caregivers] : denies aggression [Sleep Disturbances] : denies sleep disturbances [] : denies wandering [Fixed Beliefs Contradicted By Reality (Delusions)] : denies delusions [Difficulty Finding Desired Words] : denies difficulty finding desired words [0] : 2) Feeling down, depressed, or hopeless: Not at all [FreeTextEntry1] : 89 yo woman, demented, brought by her devoted caregiver 24/7 , Epifanio. Epifanio reports that she scratched her left leg and developed a small wound which is bleeding. She has been followed by Dr Cortez, dermatology for chronic itching/rash. She is maintained on muporicin and needs renewal. She also complains of intermittent bilateral ear pain, followed  by ENT who removed ear wax at that time. Denies any other pain. \par \par Three children share HCP. Cheryl Flores and Ariel ([phone]- daughter Cheryl)

## 2019-10-17 NOTE — ASSESSMENT
[FreeTextEntry1] : 89 yo woman, demented, brought by her devoted caregiver 24/7 , Epifanio. Epifanio reports that she scratched her left leg and developed a small wound which is bleeding. She has been followed by Dr Cortez, dermatology for chronic itching/rash. She is maintained on muporicin and needs renewal. She also complains of intermittent bilateral ear pain, followed  by ENT who removed ear wax at that time. Denies any other pain. \par \par 1. Dementia \par    a. Stable, continue supportive care.\par \par 2. Depression\par    a. Stable, continue sertraline 50 mg daily. \par \par 3. Unsteady gait\par    a. Fall precaution advised to pt.and MARIAM Godfrey. \par \par 4. LLE wound\par    a. Cleansed with sterile water, wet to dry wound care provided. Non adhesive gauze place on the wound and      wrapped with cling gauze. \par    b. Pt. and Epifanio educated on s/s of infection.\par    c. Return to office with any worsening symptoms. \par \par 5. Health Maintenace.\par    a. Influenza vaccine today.\par    b. Muporicin RX sent to pharmacy for wound care. \par    c. Available for urgent appointments or as needed by phone. \par \par Jamia Manning, RN, CCRN\par FNP student [employer] CARRILLO

## 2019-10-30 ENCOUNTER — RX RENEWAL (OUTPATIENT)
Age: 84
End: 2019-10-30

## 2019-12-13 ENCOUNTER — NON-APPOINTMENT (OUTPATIENT)
Age: 84
End: 2019-12-13

## 2019-12-13 ENCOUNTER — APPOINTMENT (OUTPATIENT)
Dept: OPHTHALMOLOGY | Facility: CLINIC | Age: 84
End: 2019-12-13
Payer: MEDICARE

## 2019-12-13 PROCEDURE — 92014 COMPRE OPH EXAM EST PT 1/>: CPT

## 2019-12-18 ENCOUNTER — RX RENEWAL (OUTPATIENT)
Age: 84
End: 2019-12-18

## 2020-01-29 ENCOUNTER — RX RENEWAL (OUTPATIENT)
Age: 85
End: 2020-01-29

## 2020-03-18 ENCOUNTER — RX RENEWAL (OUTPATIENT)
Age: 85
End: 2020-03-18

## 2020-06-08 ENCOUNTER — RX RENEWAL (OUTPATIENT)
Age: 85
End: 2020-06-08

## 2020-06-26 ENCOUNTER — APPOINTMENT (OUTPATIENT)
Dept: GERIATRICS | Facility: CLINIC | Age: 85
End: 2020-06-26
Payer: MEDICARE

## 2020-06-26 ENCOUNTER — LABORATORY RESULT (OUTPATIENT)
Age: 85
End: 2020-06-26

## 2020-06-26 VITALS
HEIGHT: 64 IN | HEART RATE: 85 BPM | WEIGHT: 111.13 LBS | BODY MASS INDEX: 18.97 KG/M2 | SYSTOLIC BLOOD PRESSURE: 150 MMHG | DIASTOLIC BLOOD PRESSURE: 80 MMHG | OXYGEN SATURATION: 97 % | TEMPERATURE: 98.3 F | RESPIRATION RATE: 15 BRPM

## 2020-06-26 DIAGNOSIS — R53.83 OTHER FATIGUE: ICD-10-CM

## 2020-06-26 DIAGNOSIS — D64.9 ANEMIA, UNSPECIFIED: ICD-10-CM

## 2020-06-26 DIAGNOSIS — D58.0 HEREDITARY SPHEROCYTOSIS: ICD-10-CM

## 2020-06-26 PROCEDURE — 99215 OFFICE O/P EST HI 40 MIN: CPT

## 2020-06-26 NOTE — SOCIAL HISTORY
[Two or more falls in past year] : Patient reported two or more falls in the past year [Walker] : walker [Full assistance needed] : managing finances [Seat Belt] :  uses seat belt [Grab Bars] : grab bars [FreeTextEntry1] : Epifanio

## 2020-06-26 NOTE — CURRENT MEDS
[High Risk Medications Reviewed] : High risk medications reviewed [Medication Reconciliation Completed] : Medication reconciliation completed [Reports Changes In Medication (including OTC)] : Patient reports no changes in medication [] : does not miss any medication doses

## 2020-06-26 NOTE — HISTORY OF PRESENT ILLNESS
[Severe] : Stage: Severe [0] : 1) Little interest or pleasure doing things: Not at all [Worse] : Status: Worse [FreeTextEntry1] : Patient is a 89 y/o F with significant h/o advanced dementia, hyponatremia, hypothyroidism here for a followup with symptoms concerning for advancing dementia. \par \par As per 24/7 aide Epifanio and daughter Cheryl, patient has been having increasing lethargy and coughing spells with solids. This has been worsening in the last 2 weeks. Daughter also reports patient had a fall back in march where she had hit her head with extensive bruising. No imaging had been done at this time. Epifanio reports urinary incontinence has stopped which she feels is unusual for her.

## 2020-06-26 NOTE — PHYSICAL EXAM
[General Appearance - Alert] : alert [General Appearance - In No Acute Distress] : in no acute distress [Sclera] : the sclera and conjunctiva were normal [] : no respiratory distress [Edema] : there was no peripheral edema [Neck Appearance] : the appearance of the neck was normal [Heart Sounds] : normal S1 and S2 [No Spinal Tenderness] : no spinal tenderness [Bowel Sounds] : normal bowel sounds [Urinary Bladder Findings] : the bladder was normal on palpation [Abnormal Walk] : normal gait [Skin Color & Pigmentation] : normal skin color and pigmentation [No Focal Deficits] : no focal deficits [Affect] : the affect was normal [FreeTextEntry1] : cerumen b/l partial

## 2020-06-26 NOTE — REVIEW OF SYSTEMS
[As Noted in HPI] : as noted in HPI [Sleep Disturbances] : sleep disturbances [Negative] : Neurological

## 2020-06-27 RX ORDER — NORMAL SALT TABLETS 1 G/G
1 TABLET ORAL
Qty: 60 | Refills: 0 | Status: COMPLETED | COMMUNITY
Start: 2020-06-27

## 2020-06-29 LAB
ALBUMIN SERPL ELPH-MCNC: 3.5 G/DL
ALP BLD-CCNC: 68 U/L
ALT SERPL-CCNC: 11 U/L
ANION GAP SERPL CALC-SCNC: 12 MMOL/L
AST SERPL-CCNC: 17 U/L
BASOPHILS # BLD AUTO: 0.05 K/UL
BASOPHILS NFR BLD AUTO: 0.4 %
BILIRUB SERPL-MCNC: 0.4 MG/DL
BUN SERPL-MCNC: 18 MG/DL
CALCIUM SERPL-MCNC: 8.8 MG/DL
CHLORIDE SERPL-SCNC: 89 MMOL/L
CO2 SERPL-SCNC: 26 MMOL/L
CREAT SERPL-MCNC: 0.65 MG/DL
EOSINOPHIL # BLD AUTO: 0.08 K/UL
EOSINOPHIL NFR BLD AUTO: 0.6 %
FOLATE SERPL-MCNC: 10.4 NG/ML
GLUCOSE SERPL-MCNC: 113 MG/DL
HCT VFR BLD CALC: 37.6 %
HGB BLD-MCNC: 11.8 G/DL
IMM GRANULOCYTES NFR BLD AUTO: 0.6 %
LYMPHOCYTES # BLD AUTO: 1.48 K/UL
LYMPHOCYTES NFR BLD AUTO: 10.4 %
MAN DIFF?: NORMAL
MCHC RBC-ENTMCNC: 28 PG
MCHC RBC-ENTMCNC: 31.4 GM/DL
MCV RBC AUTO: 89.3 FL
MONOCYTES # BLD AUTO: 1.24 K/UL
MONOCYTES NFR BLD AUTO: 8.7 %
NEUTROPHILS # BLD AUTO: 11.28 K/UL
NEUTROPHILS NFR BLD AUTO: 79.3 %
PLATELET # BLD AUTO: 281 K/UL
POTASSIUM SERPL-SCNC: 4.7 MMOL/L
PROT SERPL-MCNC: 6.4 G/DL
RBC # BLD: 4.21 M/UL
RBC # FLD: 13.8 %
SARS-COV-2 IGG SERPL IA-ACNC: 0.12 INDEX
SARS-COV-2 IGG SERPL QL IA: NEGATIVE
SODIUM SERPL-SCNC: 127 MMOL/L
TSH SERPL-ACNC: 9.17 UIU/ML
VIT B12 SERPL-MCNC: 1101 PG/ML
WBC # FLD AUTO: 14.22 K/UL

## 2020-06-30 ENCOUNTER — APPOINTMENT (OUTPATIENT)
Dept: GERIATRICS | Facility: CLINIC | Age: 85
End: 2020-06-30
Payer: MEDICARE

## 2020-06-30 VITALS
HEIGHT: 64 IN | HEART RATE: 97 BPM | DIASTOLIC BLOOD PRESSURE: 70 MMHG | OXYGEN SATURATION: 98 % | SYSTOLIC BLOOD PRESSURE: 130 MMHG | RESPIRATION RATE: 15 BRPM | TEMPERATURE: 97.9 F | BODY MASS INDEX: 19.01 KG/M2 | WEIGHT: 111.38 LBS

## 2020-06-30 DIAGNOSIS — R13.10 DYSPHAGIA, UNSPECIFIED: ICD-10-CM

## 2020-06-30 DIAGNOSIS — E87.1 HYPO-OSMOLALITY AND HYPONATREMIA: ICD-10-CM

## 2020-06-30 DIAGNOSIS — J69.0 PNEUMONITIS DUE TO INHALATION OF FOOD AND VOMIT: ICD-10-CM

## 2020-06-30 DIAGNOSIS — D72.829 ELEVATED WHITE BLOOD CELL COUNT, UNSPECIFIED: ICD-10-CM

## 2020-06-30 PROCEDURE — 99215 OFFICE O/P EST HI 40 MIN: CPT

## 2020-06-30 NOTE — PHYSICAL EXAM
[Extraocular Movements] : extraocular movements were intact [PERRL With Normal Accommodation] : pupils were equal in size, round, and reactive to light [Strabismus] : no strabismus was seen [Neck Cervical Mass (___cm)] : no neck mass was observed [Jugular Venous Distention Increased] : there was no jugular-venous distention [Heart Sounds] : normal S1 and S2 [Bowel Sounds] : normal bowel sounds [Abdomen Soft] : soft [Edema] : there was no peripheral edema [Abdomen Tenderness] : non-tender [Cervical Lymph Nodes Enlarged Posterior Bilaterally] : posterior cervical [Cervical Lymph Nodes Enlarged Anterior Bilaterally] : anterior cervical [Supraclavicular Lymph Nodes Enlarged Bilaterally] : supraclavicular [Axillary Lymph Nodes Enlarged Bilaterally] : axillary [No CVA Tenderness] : no ~M costovertebral angle tenderness [Involuntary Movements] : no involuntary movements were seen [] : no rash [No Spinal Tenderness] : no spinal tenderness [No Focal Deficits] : no focal deficits [FreeTextEntry1] : No fine crackles.Slightly coarse breath sounds left base

## 2020-06-30 NOTE — HISTORY OF PRESENT ILLNESS
[FreeTextEntry1] : Mrs. Genie Crowder is seen in followup. She is accompanied by her daughter Cheryl and son Ariel who assists with history. She was seen in the office on June 26 with complaint of lethargy progressing over about 2 weeks. The patient has a history of dementia. She has been experiencing intermittent cough with p.o. Daughter states that problem worse with liquids. Patient was found to have leukocytosis and hyponatremia. She was started empirically on Levaquin and salt tabs by the on-call physician. Family states that he purchased an over-the-counter urine test which did not suggest UTI. A chest x-ray was read as normal although the patient's son Ariel, who is a radiologist and has access to the actual films reviewed the film with me and it appears that there may be some increased left lower lung field interstitial markings on the lateral film.\par The patient appears to be doing much better since starting antibiotics she is more alert. In the office today the patient did not appear toxic and in pain alertness throughout the encounter, answering all questions appropriately. She has not been experiencing diarrhea and she denies burning on urination. She denies pain with deep inspiration and denies abdominal discomfort.

## 2020-06-30 NOTE — REVIEW OF SYSTEMS
[Loss Of Hearing] : hearing loss [Confused] : confusion [As Noted in HPI] : as noted in HPI [Negative] : Heme/Lymph [Fever] : no fever [Chills] : no chills

## 2020-07-06 ENCOUNTER — LABORATORY RESULT (OUTPATIENT)
Age: 85
End: 2020-07-06

## 2020-07-07 ENCOUNTER — INPATIENT (INPATIENT)
Facility: HOSPITAL | Age: 85
LOS: 0 days | Discharge: HOME | DRG: 641 | End: 2020-07-08
Attending: HOSPITALIST | Admitting: HOSPITALIST
Payer: COMMERCIAL

## 2020-07-07 VITALS
OXYGEN SATURATION: 95 % | HEART RATE: 85 BPM | WEIGHT: 110.01 LBS | RESPIRATION RATE: 16 BRPM | SYSTOLIC BLOOD PRESSURE: 131 MMHG | HEIGHT: 65 IN | TEMPERATURE: 99 F | DIASTOLIC BLOOD PRESSURE: 76 MMHG

## 2020-07-07 DIAGNOSIS — E03.9 HYPOTHYROIDISM, UNSPECIFIED: ICD-10-CM

## 2020-07-07 DIAGNOSIS — J18.9 PNEUMONIA, UNSPECIFIED ORGANISM: ICD-10-CM

## 2020-07-07 DIAGNOSIS — F41.9 ANXIETY DISORDER, UNSPECIFIED: ICD-10-CM

## 2020-07-07 DIAGNOSIS — H40.9 UNSPECIFIED GLAUCOMA: ICD-10-CM

## 2020-07-07 DIAGNOSIS — R53.1 WEAKNESS: ICD-10-CM

## 2020-07-07 DIAGNOSIS — Z29.9 ENCOUNTER FOR PROPHYLACTIC MEASURES, UNSPECIFIED: ICD-10-CM

## 2020-07-07 DIAGNOSIS — Z02.9 ENCOUNTER FOR ADMINISTRATIVE EXAMINATIONS, UNSPECIFIED: ICD-10-CM

## 2020-07-07 DIAGNOSIS — F03.90 UNSPECIFIED DEMENTIA, UNSPECIFIED SEVERITY, WITHOUT BEHAVIORAL DISTURBANCE, PSYCHOTIC DISTURBANCE, MOOD DISTURBANCE, AND ANXIETY: ICD-10-CM

## 2020-07-07 DIAGNOSIS — E87.1 HYPO-OSMOLALITY AND HYPONATREMIA: ICD-10-CM

## 2020-07-07 DIAGNOSIS — Z90.710 ACQUIRED ABSENCE OF BOTH CERVIX AND UTERUS: Chronic | ICD-10-CM

## 2020-07-07 DIAGNOSIS — D72.829 ELEVATED WHITE BLOOD CELL COUNT, UNSPECIFIED: ICD-10-CM

## 2020-07-07 LAB
ALBUMIN SERPL ELPH-MCNC: 3.1 G/DL — LOW (ref 3.3–5)
ALBUMIN SERPL ELPH-MCNC: 3.5 G/DL
ALP BLD-CCNC: 77 U/L
ALP SERPL-CCNC: 69 U/L — SIGNIFICANT CHANGE UP (ref 40–120)
ALT FLD-CCNC: 10 U/L — SIGNIFICANT CHANGE UP (ref 10–45)
ALT SERPL-CCNC: 12 U/L
ANION GAP SERPL CALC-SCNC: 11 MMOL/L — SIGNIFICANT CHANGE UP (ref 5–17)
ANION GAP SERPL CALC-SCNC: 16 MMOL/L
APPEARANCE UR: CLEAR — SIGNIFICANT CHANGE UP
AST SERPL-CCNC: 21 U/L
AST SERPL-CCNC: 22 U/L — SIGNIFICANT CHANGE UP (ref 10–40)
BACTERIA # UR AUTO: NEGATIVE — SIGNIFICANT CHANGE UP
BASOPHILS # BLD AUTO: 0.03 K/UL
BASOPHILS # BLD AUTO: 0.03 K/UL — SIGNIFICANT CHANGE UP (ref 0–0.2)
BASOPHILS NFR BLD AUTO: 0.2 %
BASOPHILS NFR BLD AUTO: 0.2 % — SIGNIFICANT CHANGE UP (ref 0–2)
BILIRUB SERPL-MCNC: 0.4 MG/DL
BILIRUB SERPL-MCNC: 0.4 MG/DL — SIGNIFICANT CHANGE UP (ref 0.2–1.2)
BILIRUB UR-MCNC: NEGATIVE — SIGNIFICANT CHANGE UP
BUN SERPL-MCNC: 14 MG/DL — SIGNIFICANT CHANGE UP (ref 7–23)
BUN SERPL-MCNC: 15 MG/DL
CALCIUM SERPL-MCNC: 8.4 MG/DL
CALCIUM SERPL-MCNC: 8.8 MG/DL — SIGNIFICANT CHANGE UP (ref 8.4–10.5)
CHLORIDE SERPL-SCNC: 92 MMOL/L
CHLORIDE SERPL-SCNC: 94 MMOL/L — LOW (ref 96–108)
CO2 SERPL-SCNC: 23 MMOL/L
CO2 SERPL-SCNC: 24 MMOL/L — SIGNIFICANT CHANGE UP (ref 22–31)
COLOR SPEC: YELLOW — SIGNIFICANT CHANGE UP
CREAT ?TM UR-MCNC: 98 MG/DL — SIGNIFICANT CHANGE UP
CREAT SERPL-MCNC: 0.52 MG/DL — SIGNIFICANT CHANGE UP (ref 0.5–1.3)
CREAT SERPL-MCNC: 0.58 MG/DL
DIFF PNL FLD: NEGATIVE — SIGNIFICANT CHANGE UP
EOSINOPHIL # BLD AUTO: 0.04 K/UL — SIGNIFICANT CHANGE UP (ref 0–0.5)
EOSINOPHIL # BLD AUTO: 0.08 K/UL
EOSINOPHIL NFR BLD AUTO: 0.3 % — SIGNIFICANT CHANGE UP (ref 0–6)
EOSINOPHIL NFR BLD AUTO: 0.6 %
EPI CELLS # UR: 1 /HPF — SIGNIFICANT CHANGE UP
GAS PNL BLDV: SIGNIFICANT CHANGE UP
GLUCOSE SERPL-MCNC: 149 MG/DL — HIGH (ref 70–99)
GLUCOSE SERPL-MCNC: 84 MG/DL
GLUCOSE UR QL: ABNORMAL
HCT VFR BLD CALC: 38.1 %
HCT VFR BLD CALC: 38.3 % — SIGNIFICANT CHANGE UP (ref 34.5–45)
HGB BLD-MCNC: 12.2 G/DL
HGB BLD-MCNC: 12.3 G/DL — SIGNIFICANT CHANGE UP (ref 11.5–15.5)
HYALINE CASTS # UR AUTO: 4 /LPF — HIGH (ref 0–2)
IMM GRANULOCYTES NFR BLD AUTO: 0.8 % — SIGNIFICANT CHANGE UP (ref 0–1.5)
IMM GRANULOCYTES NFR BLD AUTO: 1 %
KETONES UR-MCNC: NEGATIVE — SIGNIFICANT CHANGE UP
LEUKOCYTE ESTERASE UR-ACNC: NEGATIVE — SIGNIFICANT CHANGE UP
LYMPHOCYTES # BLD AUTO: 0.89 K/UL — LOW (ref 1–3.3)
LYMPHOCYTES # BLD AUTO: 1.83 K/UL
LYMPHOCYTES # BLD AUTO: 6.9 % — LOW (ref 13–44)
LYMPHOCYTES NFR BLD AUTO: 12.6 %
MAGNESIUM SERPL-MCNC: 2 MG/DL — SIGNIFICANT CHANGE UP (ref 1.6–2.6)
MAN DIFF?: NORMAL
MCHC RBC-ENTMCNC: 27.8 PG — SIGNIFICANT CHANGE UP (ref 27–34)
MCHC RBC-ENTMCNC: 27.9 PG
MCHC RBC-ENTMCNC: 32 GM/DL
MCHC RBC-ENTMCNC: 32.1 GM/DL — SIGNIFICANT CHANGE UP (ref 32–36)
MCV RBC AUTO: 86.7 FL — SIGNIFICANT CHANGE UP (ref 80–100)
MCV RBC AUTO: 87.2 FL
MONOCYTES # BLD AUTO: 0.93 K/UL — HIGH (ref 0–0.9)
MONOCYTES # BLD AUTO: 1.11 K/UL
MONOCYTES NFR BLD AUTO: 7.2 % — SIGNIFICANT CHANGE UP (ref 2–14)
MONOCYTES NFR BLD AUTO: 7.7 %
NEUTROPHILS # BLD AUTO: 10.99 K/UL — HIGH (ref 1.8–7.4)
NEUTROPHILS # BLD AUTO: 11.3 K/UL
NEUTROPHILS NFR BLD AUTO: 77.9 %
NEUTROPHILS NFR BLD AUTO: 84.6 % — HIGH (ref 43–77)
NITRITE UR-MCNC: NEGATIVE — SIGNIFICANT CHANGE UP
NRBC # BLD: 0 /100 WBCS — SIGNIFICANT CHANGE UP (ref 0–0)
PH UR: 6.5 — SIGNIFICANT CHANGE UP (ref 5–8)
PHOSPHATE SERPL-MCNC: 2.7 MG/DL — SIGNIFICANT CHANGE UP (ref 2.5–4.5)
PLATELET # BLD AUTO: 270 K/UL — SIGNIFICANT CHANGE UP (ref 150–400)
PLATELET # BLD AUTO: 306 K/UL
POTASSIUM SERPL-MCNC: 4.1 MMOL/L — SIGNIFICANT CHANGE UP (ref 3.5–5.3)
POTASSIUM SERPL-SCNC: 4.1 MMOL/L
POTASSIUM SERPL-SCNC: 4.1 MMOL/L — SIGNIFICANT CHANGE UP (ref 3.5–5.3)
PROT SERPL-MCNC: 6.1 G/DL
PROT SERPL-MCNC: 6.4 G/DL — SIGNIFICANT CHANGE UP (ref 6–8.3)
PROT UR-MCNC: SIGNIFICANT CHANGE UP
RBC # BLD: 4.37 M/UL
RBC # BLD: 4.42 M/UL — SIGNIFICANT CHANGE UP (ref 3.8–5.2)
RBC # FLD: 13.7 % — SIGNIFICANT CHANGE UP (ref 10.3–14.5)
RBC # FLD: 13.8 %
RBC CASTS # UR COMP ASSIST: 4 /HPF — SIGNIFICANT CHANGE UP (ref 0–4)
SODIUM SERPL-SCNC: 129 MMOL/L — LOW (ref 135–145)
SODIUM SERPL-SCNC: 131 MMOL/L
SODIUM UR-SCNC: 95 MMOL/L — SIGNIFICANT CHANGE UP
SP GR SPEC: 1.02 — SIGNIFICANT CHANGE UP (ref 1.01–1.02)
TSH SERPL-ACNC: 10.3 UIU/ML
TSH SERPL-MCNC: 7.44 UIU/ML — HIGH (ref 0.27–4.2)
UROBILINOGEN FLD QL: NEGATIVE — SIGNIFICANT CHANGE UP
WBC # BLD: 12.99 K/UL — HIGH (ref 3.8–10.5)
WBC # FLD AUTO: 12.99 K/UL — HIGH (ref 3.8–10.5)
WBC # FLD AUTO: 14.49 K/UL
WBC UR QL: 2 /HPF — SIGNIFICANT CHANGE UP (ref 0–5)

## 2020-07-07 PROCEDURE — 71250 CT THORAX DX C-: CPT | Mod: 26

## 2020-07-07 PROCEDURE — 99223 1ST HOSP IP/OBS HIGH 75: CPT | Mod: GC,AI

## 2020-07-07 PROCEDURE — 99285 EMERGENCY DEPT VISIT HI MDM: CPT | Mod: CS,GC

## 2020-07-07 PROCEDURE — 93010 ELECTROCARDIOGRAM REPORT: CPT | Mod: GC

## 2020-07-07 PROCEDURE — 70450 CT HEAD/BRAIN W/O DYE: CPT | Mod: 26

## 2020-07-07 RX ORDER — SERTRALINE 25 MG/1
25 TABLET, FILM COATED ORAL DAILY
Refills: 0 | Status: DISCONTINUED | OUTPATIENT
Start: 2020-07-08 | End: 2020-07-08

## 2020-07-07 RX ORDER — SODIUM CHLORIDE 9 MG/ML
1000 INJECTION INTRAMUSCULAR; INTRAVENOUS; SUBCUTANEOUS
Refills: 0 | Status: DISCONTINUED | OUTPATIENT
Start: 2020-07-07 | End: 2020-07-08

## 2020-07-07 RX ORDER — AZITHROMYCIN 500 MG/1
500 TABLET, FILM COATED ORAL ONCE
Refills: 0 | Status: COMPLETED | OUTPATIENT
Start: 2020-07-07 | End: 2020-07-07

## 2020-07-07 RX ORDER — HYDRALAZINE HCL 50 MG
5 TABLET ORAL ONCE
Refills: 0 | Status: DISCONTINUED | OUTPATIENT
Start: 2020-07-07 | End: 2020-07-07

## 2020-07-07 RX ORDER — LATANOPROST 0.05 MG/ML
1 SOLUTION/ DROPS OPHTHALMIC; TOPICAL AT BEDTIME
Refills: 0 | Status: DISCONTINUED | OUTPATIENT
Start: 2020-07-07 | End: 2020-07-08

## 2020-07-07 RX ORDER — ENOXAPARIN SODIUM 100 MG/ML
40 INJECTION SUBCUTANEOUS DAILY
Refills: 0 | Status: DISCONTINUED | OUTPATIENT
Start: 2020-07-07 | End: 2020-07-08

## 2020-07-07 RX ORDER — SODIUM CHLORIDE 9 MG/ML
500 INJECTION INTRAMUSCULAR; INTRAVENOUS; SUBCUTANEOUS ONCE
Refills: 0 | Status: COMPLETED | OUTPATIENT
Start: 2020-07-07 | End: 2020-07-07

## 2020-07-07 RX ORDER — LABETALOL HCL 100 MG
5 TABLET ORAL ONCE
Refills: 0 | Status: COMPLETED | OUTPATIENT
Start: 2020-07-07 | End: 2020-07-07

## 2020-07-07 RX ORDER — CHOLECALCIFEROL (VITAMIN D3) 125 MCG
1000 CAPSULE ORAL DAILY
Refills: 0 | Status: DISCONTINUED | OUTPATIENT
Start: 2020-07-07 | End: 2020-07-08

## 2020-07-07 RX ORDER — CEFTRIAXONE 500 MG/1
1000 INJECTION, POWDER, FOR SOLUTION INTRAMUSCULAR; INTRAVENOUS ONCE
Refills: 0 | Status: COMPLETED | OUTPATIENT
Start: 2020-07-07 | End: 2020-07-07

## 2020-07-07 RX ORDER — LEVOTHYROXINE SODIUM 125 MCG
50 TABLET ORAL DAILY
Refills: 0 | Status: DISCONTINUED | OUTPATIENT
Start: 2020-07-08 | End: 2020-07-08

## 2020-07-07 RX ORDER — SERTRALINE 25 MG/1
25 TABLET, FILM COATED ORAL ONCE
Refills: 0 | Status: COMPLETED | OUTPATIENT
Start: 2020-07-07 | End: 2020-07-07

## 2020-07-07 RX ADMIN — Medication 5 MILLIGRAM(S): at 21:48

## 2020-07-07 RX ADMIN — CEFTRIAXONE 1000 MILLIGRAM(S): 500 INJECTION, POWDER, FOR SOLUTION INTRAMUSCULAR; INTRAVENOUS at 16:40

## 2020-07-07 RX ADMIN — ENOXAPARIN SODIUM 40 MILLIGRAM(S): 100 INJECTION SUBCUTANEOUS at 21:49

## 2020-07-07 RX ADMIN — AZITHROMYCIN 250 MILLIGRAM(S): 500 TABLET, FILM COATED ORAL at 16:45

## 2020-07-07 RX ADMIN — LATANOPROST 1 DROP(S): 0.05 SOLUTION/ DROPS OPHTHALMIC; TOPICAL at 21:49

## 2020-07-07 RX ADMIN — SODIUM CHLORIDE 75 MILLILITER(S): 9 INJECTION INTRAMUSCULAR; INTRAVENOUS; SUBCUTANEOUS at 21:59

## 2020-07-07 RX ADMIN — SERTRALINE 25 MILLIGRAM(S): 25 TABLET, FILM COATED ORAL at 21:49

## 2020-07-07 RX ADMIN — SODIUM CHLORIDE 500 MILLILITER(S): 9 INJECTION INTRAMUSCULAR; INTRAVENOUS; SUBCUTANEOUS at 13:20

## 2020-07-07 RX ADMIN — CEFTRIAXONE 100 MILLIGRAM(S): 500 INJECTION, POWDER, FOR SOLUTION INTRAMUSCULAR; INTRAVENOUS at 13:52

## 2020-07-07 RX ADMIN — SODIUM CHLORIDE 500 MILLILITER(S): 9 INJECTION INTRAMUSCULAR; INTRAVENOUS; SUBCUTANEOUS at 16:40

## 2020-07-07 NOTE — H&P ADULT - NSHPPHYSICALEXAM_GEN_ALL_CORE
VITAL SIGNS: I have reviewed nursing notes and confirm.  CONSTITUTIONAL: Well-developed; well-nourished; in no acute distress.   SKIN:  warm and dry, no acute rash.   HEAD:  normocephalic, atraumatic.  EYES: EOM intact; conjunctiva and sclera clear.  ENT: No nasal discharge; airway clear, dry oral mucosa  NECK: Supple; non tender.  CARD: S1, S2 normal; no murmurs, gallops, or rubs. Regular rate and rhythm.   RESP:  Clear to auscultation b/l, no wheezes, rales or rhonchi.  ABD: Normal bowel sounds; soft; non-distended; non-tender; no guarding/ rebound.  EXT: Normal ROM. No clubbing, cyanosis or edema. 2+ pulses to b/l ue/le.  NEURO: Alert, oriented to person and generally aware she is in the hospital, can not name the hospital, the year or the president of the   CN II-XII intact, motor/ sensation intact and equal b/l, neg pronator drift, speech clear.   PSYCH: Cooperative, mood and affect appropriate.

## 2020-07-07 NOTE — ED PROVIDER NOTE - CLINICAL SUMMARY MEDICAL DECISION MAKING FREE TEXT BOX
elderly female with recent dx of pna sp course of ABX, pw persistent lethargy and generalized weakness. Possible electrolyte disturbance, PNA, UTI, encephalopathy, hypothyroid. Labs, CT head, and Chest, mg phos, UA, ucx, cultures. Likely admission for failed outpt treatment.

## 2020-07-07 NOTE — H&P ADULT - PROBLEM SELECTOR PLAN 4
hx of hypothyroidism, elevated tsh 7.44 on presentation,   -will follow up free t3 and t4 ct with left upper lobe consolidation  completed course of levaquin on 7/4  no clinical signs of pneumonia  will monitor off abx  f/u urine legionella procalcitonin ct with left upper lobe consolidation  completed course of levaquin on 7/4  no clinical signs of pneumonia  will monitor off abx  f/u urine legionella procalcitonin  swallow eval for dysphagia

## 2020-07-07 NOTE — ED PROVIDER NOTE - ATTENDING CONTRIBUTION TO CARE
attending Vibha: 89yF h/o dementia (baseline oriented x 1-2), hyponatremia (baseline 130), hypothyroid, presents with daughter from home for worsening generalized weakness x 1.5 weeks. Had leukocytosis and possible PNA on outpatient cxr, completed course of levaquin (last dose 2 days ago) without improvement. No recent falls/head trauma. No cough, fever, vomiting, diarrhea, pain. Today with profound weakness to the point she was unable to ambulate with walker. Will obtain labs, UA/urine lytes, CTH and CT chest eval stroke/PNA, likely admit

## 2020-07-07 NOTE — H&P ADULT - ATTENDING COMMENTS
Dr. Avril Puentes  Division of Hospital Medicine  [facility]   Pager: [phone]    Patient seen and examined today with Team 7 Resident and Interns. Agree with above findings, assessment, and plan with the following additions/exceptions:    overall, patient with increased lethargy at home with leukocytosis as outpatient and CXR with ?early signs of PNA s/p CAP treatment as outpatient with levaquin, last day 7/4. lethargy and malaise not improved, thus repeat outpatient labs performed with WBC as outpatient yesterday elevated to 14K. CT chest negative for infiltrate/consolidation; there is a MUNDO lesion present though per family was present on prior imaging. on admission, no clinical signs of infection. does have leukocytosis to 12K, but no fever and no other sign/symptoms of infection at this time. UA unremarkable. CT chest with no evidence of pneumonia. f/u infectious blood cultures. but will hold off on empiric abx at this time as unclear if active infectious process going on. it seems there was some suspicion of dysphagia or aspiration outpatient. f/u official swallow eval. dysphagia diet for not.    documentation of chronic hyponatremia outpatient, but etiology unclear. markedly hypovolemic on exam. will need to get more collateral from her family and aide with regards to her PO intake of solute and free water at home. suspect low. gentle hydration with IVF overnight. hyponatremia workup as above.    low suspicion for active infection contributing to her ongoing lethargy and weakness at this time. multifactorial, could be from her hyponatremia, hypothyroidism, or possibly recovering from already treated infection. she was actually pretty awake and alert, quite conversant, for me at time of my exam which was reassuring.    PT eval    Rest as detailed in note above.

## 2020-07-07 NOTE — H&P ADULT - NSHPREVIEWOFSYSTEMS_GEN_ALL_CORE
ROS:  Constitutional: Denies fever/chills  HEENT: Negative  Pulm: Denies shortness of breath, cough  Cardio: Denies chest pain, palpitations  GI:  Denies abdominal pain, N/V, diarrhea/constipation.   : Denies dysuria  Neuro: + weakness, Denies headaches, numbness/tingling, dizziness  Skin: Denies rashes  Psych:  Denies depression, anxiety

## 2020-07-07 NOTE — ED ADULT NURSE REASSESSMENT NOTE - NS ED NURSE REASSESS COMMENT FT1
Pt A+Ox2, medication infusing, VSS. Pt aware of plan of care, admitted to medicine for pneumonia, pending admission bed.

## 2020-07-07 NOTE — H&P ADULT - PROBLEM SELECTOR PLAN 9
Transitions of Care Status:  1.  Name of PCP:  2.  PCP Contacted on Admission: [ ] Y    [ ] N    3.  PCP contacted at Discharge: [ ] Y    [ ] N    [ ] N/A  4.  Post-Discharge Appointment Date and Location:  5.  Summary of Handoff given to PCP: Diet- regular  DVT prophylaxis Lovenox 40mg sq

## 2020-07-07 NOTE — H&P ADULT - PROBLEM SELECTOR PLAN 1
The patient has 3 weeks of worsening generalized weakness, with inability to ambulate. Elevated WBC with neutrophil predominance suggests possible infectious etiology of weakness, source is unclear, recently completed course of levaquin for possible pneumonia, normal UA, other etiologies include hyponatremia, hypothyroidism, toxic or metabolic causes, neurologic causes  trend cbc, cmp, obtain b12, folate

## 2020-07-07 NOTE — ED ADULT NURSE REASSESSMENT NOTE - NS ED NURSE REASSESS COMMENT FT1
Straight cath completed by ED RN and ED tech as per MD order, sterile technique used. 100cc clear yellow urine drained from bladder, urine sample collected and sent to lab. Pt cleaned and repositioned in stretcher.

## 2020-07-07 NOTE — H&P ADULT - PROBLEM SELECTOR PLAN 2
na 127 on VBG  has hx of hyponatremia  clinically dry on exam  likely hypoosmolar, hypovolemic hyponatremia possibly due to poor po or siadh  will obtain Urine osm and Serum Osm WBC 12.99  Unclear etiology  UA normal  Will trend CBC  CT chest does not suggest pneumonia WBC 12.99, overall downtrending from outpatient labs  Unclear etiology  UA normal  Will trend CBC  CT chest does not suggest pneumonia

## 2020-07-07 NOTE — H&P ADULT - NSHPLABSRESULTS_GEN_ALL_CORE
Comprehensive Metabolic Panel (07.07.20 @ 12:00)    Sodium, Serum: 129 mmol/L    Potassium, Serum: 4.1 mmol/L    Chloride, Serum: 94 mmol/L    Carbon Dioxide, Serum: 24 mmol/L    Anion Gap, Serum: 11 mmol/L    Blood Urea Nitrogen, Serum: 14 mg/dL    Creatinine, Serum: 0.52 mg/dL    Glucose, Serum: 149 mg/dL    Calcium, Total Serum: 8.8 mg/dL    Protein Total, Serum: 6.4 g/dL    Albumin, Serum: 3.1 g/dL    Bilirubin Total, Serum: 0.4 mg/dL    Alkaline Phosphatase, Serum: 69 U/L  Magnesium, Serum: 2.0 mg/dL (07.07.20 @ 12:00)  Phosphorus Level, Serum: 2.7 mg/dL (07.07.20 @ 12:00)  Thyroid Stimulating Hormone, Serum (07.07.20 @ 14:26)    Thyroid Stimulating Hormone, Serum: 7.44 uIU/mL    Complete Blood Count + Automated Diff (07.07.20 @ 12:00)    WBC Count: 12.99 K/uL    RBC Count: 4.42 M/uL    Hemoglobin: 12.3 g/dL    Hematocrit: 38.3 %    Mean Cell Volume: 86.7 fl    Mean Cell Hemoglobin: 27.8 pg    Mean Cell Hemoglobin Conc: 32.1 gm/dL    Red Cell Distrib Width: 13.7 %    Platelet Count - Automated: 270 K/uL    Auto Neutrophil #: 10.99 K/uL    Auto Lymphocyte #: 0.89 K/uL    Auto Monocyte #: 0.93 K/uL    Auto Eosinophil #: 0.04 K/uL    Auto Basophil #: 0.03 K/uL    Auto Neutrophil %: 84.6: Differential percentages must be correlated with absolute numbers for  clinical significance. %    Auto Lymphocyte %: 6.9 %    Auto Monocyte %: 7.2 %    Auto Eosinophil %: 0.3 %    Auto Basophil %: 0.2 %    Auto Immature Granulocyte %: 0.8 %    Nucleated RBC: 0 /100 WBCs  Urinalysis + Microscopic Examination (07.07.20 @ 14:41)    pH Urine: 6.5    Leukocyte Esterase Concentration: Negative    Urine Appearance: Clear    Urobilinogen: Negative    Specific Gravity: 1.022    Protein, Urine: Trace    Color: Yellow    Glucose Qualitative, Urine: Trace    Blood, Urine: Negative    Nitrite: Negative    Ketone - Urine: Negative    Bilirubin: Negative    Red Blood Cell - Urine: 4 /hpf    White Blood Cell - Urine: 2 /HPF    Epithelial Cells: 1 /hpf    Hyaline Casts: 4 /lpf    Bacteria: Negative    < from: CT Head No Cont (07.07.20 @ 12:51) >    Impression:    No acute hemorrhage, mass effect or extra-axial collections. Age-appropriate involutional changes and moderate microvascular ischemic change.      < end of copied text >    < from: CT Chest No Cont (07.07.20 @ 12:49) >    IMPRESSION: Left upper lobe ill-defined nodular opacity as above. Differential diagnosis include a focus of infection or primary lung neoplasm. A 1 to 3 month follow-up noncontrast chest CT is recommended for further evaluation.        < end of copied text >

## 2020-07-07 NOTE — ED ADULT NURSE NOTE - OBJECTIVE STATEMENT
89 year old female presenting to ED, sent by PCP, for abnormal lab results. PMH includes hyponatremia, hypothyroid, and dementia. Pt A+Ox2, oriented to person and place, moves all four extremities. As per daughter, pt has been increasingly weak and lethargic x2 weeks. Pt recently finished course of Levaquin, however with no improvement as per daughter, and repeat labs revealed persistent leukocytosis. 89 year old female presenting to ED, sent by PCP, for abnormal lab results. PMH includes hyponatremia, hypothyroid, and dementia. Pt A+Ox2, oriented to person and place, moves all four extremities. As per daughter, pt has been increasingly weak and lethargic x2 weeks. Pt recently finished course of Levaquin, however with no improvement as per daughter, and repeat labs revealed persistent leukocytosis. Pt denies headache, dizziness, chest pain, SOB, N/V, abdominal pain, urinary or bowel symptoms.

## 2020-07-07 NOTE — H&P ADULT - PROBLEM SELECTOR PLAN 3
ct with left upper lobe consolidation  completed course of levaquin on 7/4  no clinical signs of pneumonia  will monitor off abx na 127 on VBG  has hx of hyponatremia  clinically dry on exam  likely hypoosmolar, hypovolemic hyponatremia possibly due to poor po or siadh  will obtain Urine osm and Serum Osm, [place]  Maintenance fluids NS at 75ml/h

## 2020-07-07 NOTE — H&P ADULT - NSHPSOCIALHISTORY_GEN_ALL_CORE
non smoker, non drinker, no drug use, lives in [facility] with 24 hour home care,  x 8 years, 3 children, homemaker, holocaust survivor

## 2020-07-07 NOTE — H&P ADULT - PROBLEM SELECTOR PLAN 6
hx of glaucoma  -continue home xalatan hx of dementia, baseline mental status, normal head ct  speech and swallow eval for possible dysphagia

## 2020-07-07 NOTE — ED PROVIDER NOTE - NS ED ROS FT
GENERAL: No fever, chills + lethargy.   EYES: no vision changes, no discharge.   ENT: no congestion or speaking   CARDIAC: no chest pain/pressure, SOB, lower extremity swelling  PULMONARY: no cough, SOB  GI: no abdominal pain, n/v/d  : no dysuria  SKIN: no rashes  NEURO: no headache  MSK: No joint pain, myalgia

## 2020-07-07 NOTE — H&P ADULT - HISTORY OF PRESENT ILLNESS
The patient is a 89 year old woman, Holocaust survivor, with hx of dementia, hypothyroid, glaucoma, and hyponatremia presenting for generalized weakness. For the last 2-3 weeks the patient has been having increasing difficulty getting up from sitting and walking with her walker. She has also been very somnolent and sleeps much of the day. She has no unilateral or focal weakness in any part of her body. She has a hx of falls but no falls since March. She denies dizziness, cp or palpitations. She denies any shortness of breath. Her daughter notes that the patient gets short of breath  when speaking, but not with activity. The patient has had no cough or fever. She has no difficulty laying flat and has no lower extremity swelling.  She has had no pain with urination, increase in frequency, or urgency. She has no wounds or ulcers.    The patient is at her baseline mental status as per her daughter Cheryl. Cheryl notes that the patient has a hx of dementia and has had a gradual cognitive decline over several years. Cheryl feels that her mother is not more confused than normal, but is notably weaker and more somnolent.    The patient was evaluated by her new geriatrician Dr. Florence, had an xray and labs drawn. She was treated for a presumptive pneumonia and completed a course of Levaquin on 7/4. She did not improve following treatment and was referred to the ED for further workup.    In the ED T99.4, HR 85, 131/76 RR16 95% on RA  Labs show WBC count 12.99 with neutrophil predominance, Sodium 127 on VBG, Normal UA, TSH 7.44  CTHead showed no signs of stroke or mass effect  CT chest with L upper lobe mass suspicious for malignancy or pneumonia    The patient was admitted to medicine for further workup of weakness

## 2020-07-07 NOTE — H&P ADULT - ASSESSMENT
The patient is a 89 year old woman, Holocaust survivor, with hx of dementia, hypothyroid, glaucoma, and hyponatremia presenting for generalized weakness x 2-3 weeks. Completed course of Levaquin outpatient for possible pneumonia with no improvement in symptoms. Patient with normal vitals, no signs of sepsis, no focal findings on exam, elevated WBC count with neutrophil predominance, hyponatremia ,normal UA, elevated TSH, normal CT head and CT chest concerning for left upper lobe consolidation/malignancy

## 2020-07-07 NOTE — H&P ADULT - PROBLEM SELECTOR PLAN 5
hx of dementia, baseline mental status, normal head ct hx of hypothyroidism, elevated tsh 7.44 on presentation,   -will follow up free t3 and t4

## 2020-07-08 ENCOUNTER — TRANSCRIPTION ENCOUNTER (OUTPATIENT)
Age: 85
End: 2020-07-08

## 2020-07-08 VITALS
OXYGEN SATURATION: 97 % | RESPIRATION RATE: 18 BRPM | SYSTOLIC BLOOD PRESSURE: 129 MMHG | DIASTOLIC BLOOD PRESSURE: 58 MMHG | HEART RATE: 74 BPM

## 2020-07-08 LAB
ALBUMIN SERPL ELPH-MCNC: 2.7 G/DL — LOW (ref 3.3–5)
ALP SERPL-CCNC: 59 U/L — SIGNIFICANT CHANGE UP (ref 40–120)
ALT FLD-CCNC: 8 U/L — LOW (ref 10–45)
ANION GAP SERPL CALC-SCNC: 8 MMOL/L — SIGNIFICANT CHANGE UP (ref 5–17)
AST SERPL-CCNC: 18 U/L — SIGNIFICANT CHANGE UP (ref 10–40)
BASOPHILS # BLD AUTO: 0.04 K/UL — SIGNIFICANT CHANGE UP (ref 0–0.2)
BASOPHILS NFR BLD AUTO: 0.4 % — SIGNIFICANT CHANGE UP (ref 0–2)
BILIRUB SERPL-MCNC: 0.3 MG/DL — SIGNIFICANT CHANGE UP (ref 0.2–1.2)
BUN SERPL-MCNC: 8 MG/DL — SIGNIFICANT CHANGE UP (ref 7–23)
CALCIUM SERPL-MCNC: 8.3 MG/DL — LOW (ref 8.4–10.5)
CHLORIDE SERPL-SCNC: 94 MMOL/L — LOW (ref 96–108)
CO2 SERPL-SCNC: 26 MMOL/L — SIGNIFICANT CHANGE UP (ref 22–31)
CREAT ?TM UR-MCNC: 34 MG/DL — SIGNIFICANT CHANGE UP
CREAT SERPL-MCNC: 0.49 MG/DL — LOW (ref 0.5–1.3)
CULTURE RESULTS: SIGNIFICANT CHANGE UP
EOSINOPHIL # BLD AUTO: 0.12 K/UL — SIGNIFICANT CHANGE UP (ref 0–0.5)
EOSINOPHIL NFR BLD AUTO: 1.1 % — SIGNIFICANT CHANGE UP (ref 0–6)
GLUCOSE SERPL-MCNC: 102 MG/DL — HIGH (ref 70–99)
HCT VFR BLD CALC: 34.1 % — LOW (ref 34.5–45)
HGB BLD-MCNC: 11 G/DL — LOW (ref 11.5–15.5)
IMM GRANULOCYTES NFR BLD AUTO: 0.7 % — SIGNIFICANT CHANGE UP (ref 0–1.5)
LEGIONELLA AG UR QL: NEGATIVE — SIGNIFICANT CHANGE UP
LYMPHOCYTES # BLD AUTO: 1.72 K/UL — SIGNIFICANT CHANGE UP (ref 1–3.3)
LYMPHOCYTES # BLD AUTO: 15.2 % — SIGNIFICANT CHANGE UP (ref 13–44)
MAGNESIUM SERPL-MCNC: 1.8 MG/DL — SIGNIFICANT CHANGE UP (ref 1.6–2.6)
MCHC RBC-ENTMCNC: 27.9 PG — SIGNIFICANT CHANGE UP (ref 27–34)
MCHC RBC-ENTMCNC: 32.3 GM/DL — SIGNIFICANT CHANGE UP (ref 32–36)
MCV RBC AUTO: 86.5 FL — SIGNIFICANT CHANGE UP (ref 80–100)
MONOCYTES # BLD AUTO: 0.92 K/UL — HIGH (ref 0–0.9)
MONOCYTES NFR BLD AUTO: 8.1 % — SIGNIFICANT CHANGE UP (ref 2–14)
NEUTROPHILS # BLD AUTO: 8.44 K/UL — HIGH (ref 1.8–7.4)
NEUTROPHILS NFR BLD AUTO: 74.5 % — SIGNIFICANT CHANGE UP (ref 43–77)
NRBC # BLD: 0 /100 WBCS — SIGNIFICANT CHANGE UP (ref 0–0)
OSMOLALITY SERPL: 263 MOSMOL/KG — LOW (ref 280–301)
OSMOLALITY UR: 457 MOS/KG — SIGNIFICANT CHANGE UP (ref 300–900)
PHOSPHATE SERPL-MCNC: 2.9 MG/DL — SIGNIFICANT CHANGE UP (ref 2.5–4.5)
PLATELET # BLD AUTO: 252 K/UL — SIGNIFICANT CHANGE UP (ref 150–400)
POTASSIUM SERPL-MCNC: 3.7 MMOL/L — SIGNIFICANT CHANGE UP (ref 3.5–5.3)
POTASSIUM SERPL-SCNC: 3.7 MMOL/L — SIGNIFICANT CHANGE UP (ref 3.5–5.3)
POTASSIUM UR-SCNC: 21 MMOL/L — SIGNIFICANT CHANGE UP
PROT SERPL-MCNC: 5.6 G/DL — LOW (ref 6–8.3)
RBC # BLD: 3.94 M/UL — SIGNIFICANT CHANGE UP (ref 3.8–5.2)
RBC # FLD: 13.6 % — SIGNIFICANT CHANGE UP (ref 10.3–14.5)
SARS-COV-2 RNA SPEC QL NAA+PROBE: SIGNIFICANT CHANGE UP
SODIUM SERPL-SCNC: 128 MMOL/L — LOW (ref 135–145)
SODIUM UR-SCNC: 157 MMOL/L — SIGNIFICANT CHANGE UP
SPECIMEN SOURCE: SIGNIFICANT CHANGE UP
T3 SERPL-MCNC: 54 NG/DL — LOW (ref 80–200)
T4 FREE SERPL-MCNC: 1.3 NG/DL — SIGNIFICANT CHANGE UP (ref 0.9–1.8)
UUN UR-MCNC: 313 MG/DL — SIGNIFICANT CHANGE UP
WBC # BLD: 11.32 K/UL — HIGH (ref 3.8–10.5)
WBC # FLD AUTO: 11.32 K/UL — HIGH (ref 3.8–10.5)

## 2020-07-08 PROCEDURE — 82947 ASSAY GLUCOSE BLOOD QUANT: CPT

## 2020-07-08 PROCEDURE — 87086 URINE CULTURE/COLONY COUNT: CPT

## 2020-07-08 PROCEDURE — 84480 ASSAY TRIIODOTHYRONINE (T3): CPT

## 2020-07-08 PROCEDURE — 82962 GLUCOSE BLOOD TEST: CPT

## 2020-07-08 PROCEDURE — 83605 ASSAY OF LACTIC ACID: CPT

## 2020-07-08 PROCEDURE — 84100 ASSAY OF PHOSPHORUS: CPT

## 2020-07-08 PROCEDURE — 99285 EMERGENCY DEPT VISIT HI MDM: CPT | Mod: 25

## 2020-07-08 PROCEDURE — 96374 THER/PROPH/DIAG INJ IV PUSH: CPT | Mod: XU

## 2020-07-08 PROCEDURE — 84443 ASSAY THYROID STIM HORMONE: CPT

## 2020-07-08 PROCEDURE — 85014 HEMATOCRIT: CPT

## 2020-07-08 PROCEDURE — 97161 PT EVAL LOW COMPLEX 20 MIN: CPT

## 2020-07-08 PROCEDURE — 84132 ASSAY OF SERUM POTASSIUM: CPT

## 2020-07-08 PROCEDURE — 83930 ASSAY OF BLOOD OSMOLALITY: CPT

## 2020-07-08 PROCEDURE — 93005 ELECTROCARDIOGRAM TRACING: CPT | Mod: XU

## 2020-07-08 PROCEDURE — 80053 COMPREHEN METABOLIC PANEL: CPT

## 2020-07-08 PROCEDURE — 84436 ASSAY OF TOTAL THYROXINE: CPT

## 2020-07-08 PROCEDURE — 87449 NOS EACH ORGANISM AG IA: CPT

## 2020-07-08 PROCEDURE — 87040 BLOOD CULTURE FOR BACTERIA: CPT

## 2020-07-08 PROCEDURE — 84300 ASSAY OF URINE SODIUM: CPT

## 2020-07-08 PROCEDURE — 84133 ASSAY OF URINE POTASSIUM: CPT

## 2020-07-08 PROCEDURE — 84295 ASSAY OF SERUM SODIUM: CPT

## 2020-07-08 PROCEDURE — U0003: CPT

## 2020-07-08 PROCEDURE — 84439 ASSAY OF FREE THYROXINE: CPT

## 2020-07-08 PROCEDURE — 71250 CT THORAX DX C-: CPT

## 2020-07-08 PROCEDURE — 83735 ASSAY OF MAGNESIUM: CPT

## 2020-07-08 PROCEDURE — 82330 ASSAY OF CALCIUM: CPT

## 2020-07-08 PROCEDURE — 84540 ASSAY OF URINE/UREA-N: CPT

## 2020-07-08 PROCEDURE — 82570 ASSAY OF URINE CREATININE: CPT

## 2020-07-08 PROCEDURE — 92610 EVALUATE SWALLOWING FUNCTION: CPT

## 2020-07-08 PROCEDURE — 83935 ASSAY OF URINE OSMOLALITY: CPT

## 2020-07-08 PROCEDURE — 81001 URINALYSIS AUTO W/SCOPE: CPT

## 2020-07-08 PROCEDURE — 85027 COMPLETE CBC AUTOMATED: CPT

## 2020-07-08 PROCEDURE — 82803 BLOOD GASES ANY COMBINATION: CPT

## 2020-07-08 PROCEDURE — 70450 CT HEAD/BRAIN W/O DYE: CPT

## 2020-07-08 PROCEDURE — 99239 HOSP IP/OBS DSCHRG MGMT >30: CPT

## 2020-07-08 PROCEDURE — 51701 INSERT BLADDER CATHETER: CPT

## 2020-07-08 PROCEDURE — 82435 ASSAY OF BLOOD CHLORIDE: CPT

## 2020-07-08 RX ORDER — SODIUM CHLORIDE 9 MG/ML
500 INJECTION INTRAMUSCULAR; INTRAVENOUS; SUBCUTANEOUS
Refills: 0 | Status: DISCONTINUED | OUTPATIENT
Start: 2020-07-08 | End: 2020-07-08

## 2020-07-08 RX ADMIN — SODIUM CHLORIDE 50 MILLILITER(S): 9 INJECTION INTRAMUSCULAR; INTRAVENOUS; SUBCUTANEOUS at 12:37

## 2020-07-08 NOTE — PROGRESS NOTE ADULT - PROBLEM SELECTOR PLAN 5
hx of hypothyroidism, elevated tsh 7.44 on presentation,   -normal T4, no clear indication to adjust levothyroxine inpatient

## 2020-07-08 NOTE — PROGRESS NOTE ADULT - ATTENDING COMMENTS
Dr. Avril Puentes  Division of Hospital Medicine  [facility]   Pager: [phone]    Patient seen and examined today with Team 7 Resident and Interns. Agree with above findings, assessment, and plan with the following additions/exceptions:    Discussed at length with patient's son, Ariel, both on the phone and bedside (who is a physician)  No clinical nor objective signs of active infection at this time.  Suspect leukocytosis seen on most recent home draw labs with component of hemoconcentration in setting of dehydration.  Afebrile. no clinical evidence of PNA. infectious work-up negative thus far  blood cultures with NGTD.    medically stable to discharge to home at this time.  will need to follow-up the blood cultures (NGTD) as only has been 24 hours  but son Ariel states he spoke to Dr. Florence (PCP) who will follow-up as well.  Dr. Florence to arrange repeat labs as outpatient.    Discharge planning time spent 49 minutes.  Rest as detailed in note above.

## 2020-07-08 NOTE — SWALLOW BEDSIDE ASSESSMENT ADULT - NS SPL SWALLOW CLINIC TRIAL FT
With cup sips, pt with increased rate/amount given difficulty self-feeding. Improved control via small controlled straw sips.

## 2020-07-08 NOTE — DISCHARGE NOTE PROVIDER - NSDCFUADDAPPT_GEN_ALL_CORE_FT
Please follow up with your primary care doctor, Dr. Florence, after leaving the hospital. You have a set of blood cultures that are still pending results in our laboratory. Dr. Florence will check on these cultures and will notify you if they are positive or negative.

## 2020-07-08 NOTE — SWALLOW BEDSIDE ASSESSMENT ADULT - SLP GENERAL OBSERVATIONS
Pt encountered awake and alert, reclined in bed on room air. Daughter Cheryl at bedside. History obtained from Pt and daughter. PTA Pt consumed regular texture finger foods due to difficulty holding utensils/reported tremor. Pt intermittent coughs at home w/ thin liquids when drinking from a cup. Pt no recent history of pneumonia or pulmonary compromise. Pt encountered awake and alert, reclined in bed on room air. A&Ox1-2. Able to follow directives. Pt vocal quality slightly wet at bedside which resolved with cued swallow. Daughter Cheryl at bedside. History obtained from Pt and daughter. PTA Pt consumed regular texture finger foods due to difficulty holding utensils/reported tremor. Pt intermittent coughs at home w/ thin liquids when drinking from a cup. Pt no recent history of pneumonia or pulmonary compromise.

## 2020-07-08 NOTE — PROGRESS NOTE ADULT - PROBLEM SELECTOR PLAN 1
The patient has 3 weeks of worsening generalized weakness, with inability to ambulate. Elevated WBC with neutrophil predominance suggests possible infectious etiology of weakness, source is unclear, recently completed course of levaquin for possible pneumonia, normal UA, other etiologies include hyponatremia, hypothyroidism, toxic or metabolic causes, neurologic causes  trend cbc, cmp  pt eval pending

## 2020-07-08 NOTE — SWALLOW BEDSIDE ASSESSMENT ADULT - COMMENTS
History Continued: The patient was evaluated by her new geriatrician Dr. Florence, had an xray and labs drawn. She was treated for a presumptive pneumonia and completed a course of Levaquin on 7/4. She did not improve following treatment and was referred to the ED for further workup. In the ED T99.4, HR 85, 131/76 RR16 95% on RA. Labs show WBC count 12.99 with neutrophil predominance, Sodium 127 on VBG, Normal UA, TSH 7.44. CT Head showed no signs of stroke or mass effect. CT chest with L upper lobe mass suspicious for malignancy or pneumonia No overt, clinical s/s of aspiration/penetration Not adequate assessment as Pt took two single cup sips; refused additional trials. History Continued: The patient was evaluated by her new geriatrician Dr. Florence, had an xray and labs drawn. She was treated for a presumptive pneumonia and completed a course of Levaquin on 7/4. She did not improve following treatment and was referred to the ED for further workup. In the ED T99.4, HR 85, 131/76 RR16 95% on RA. Labs show WBC count 12.99 with neutrophil predominance, Sodium 127 on VBG, Normal UA, TSH 7.44. CT Head showed no signs of stroke or mass effect. CT chest with L upper lobe mass suspicious for malignancy or pneumonia    CT Chest 7/7: No enlarged axillary, mediastinal or hilar lymph nodes. Calcified mediastinal and right hilar lymph nodes are suggestive of prior granulomatous infection. No pleural effusions. Heart size is normal. No pericardial effusion. Atherosclerotic disease with involvement of the aorta and the coronary arteries. Evaluation of the upper abdomen demonstrate no significant abnormality. Evaluation of the lungs demonstrate mild bilateral lower lung bronchiectasis within the lower lobes and the right middle lobe. 6 mm right upper lobe calcified granuloma. Left upper lobe ill-defined nodular opacity measuring about 1.7 cm x 8 mm adjacent to the mediastinum. No central endobronchial lesions. Degenerative changes of the spine.

## 2020-07-08 NOTE — DISCHARGE NOTE PROVIDER - NSDCFUADDINST_GEN_ALL_CORE_FT
Please try to limit the amount of fluid you drink in 24 hours to around 1-1.25L. This will help your sodium remain at a normal level since your laboratory data is suggestive of SIADH (an increase in hormone that effectively decreases overall salt levels).

## 2020-07-08 NOTE — DISCHARGE NOTE PROVIDER - HOSPITAL COURSE
The patient is a 89 year old woman, Holocaust survivor, with hx of dementia, hypothyroid, glaucoma, and hyponatremia presenting for generalized weakness. For the last 2-3 weeks the patient has been having increasing difficulty getting up from sitting and walking with her walker. She has also been very somnolent and sleeps much of the day. She has no unilateral or focal weakness in any part of her body. She has a hx of falls but no falls since March. She denies dizziness, cp or palpitations. She denies any shortness of breath. Her daughter notes that the patient gets short of breath  when speaking, but not with activity. The patient has had no cough or fever. She has no difficulty laying flat and has no lower extremity swelling.  She has had no pain with urination, increase in frequency, or urgency. She has no wounds or ulcers.        The patient is at her baseline mental status as per her daughter Cheryl. Cheryl notes that the patient has a hx of dementia and has had a gradual cognitive decline over several years. Cheryl feels that her mother is not more confused than normal, but is notably weaker and more somnolent.        The patient was evaluated by her new geriatrician Dr. Florence, had an xray and labs drawn. She was treated for a presumptive pneumonia and completed a course of Levaquin on 7/4. She did not improve following treatment and was referred to the ED for further workup.        In the ED T99.4, HR 85, 131/76 RR16 95% on RA    Labs show WBC count 12.99 with neutrophil predominance, Sodium 127 on VBG, Normal UA, TSH 7.44    CTHead showed no signs of stroke or mass effect    CT chest with L upper lobe mass suspicious for malignancy        The patient was admitted to medicine for further workup of weakness.        The patient's hospital course was notable for an episode of hypertension at night after becoming upset about using a bedpan. The patient's blood pressure normalized after practicing relaxation techniques and deep breathing.    Repeat labs on 7/8 showed a decreased WBC 11.3, Na 128, T4 1.3, Serum Osm 263, and Urine Na 157 and Urine Osm 457.    Based on clinical presentation, laboratory data, and imaging the decision was made to defer antibiotic treatment unless blood cx returned positive. After discussions with patient's family members the family .... The patient is a 89 year old woman, Holocaust survivor, with hx of dementia, hypothyroid, glaucoma, and hyponatremia presenting for generalized weakness. For the last 2-3 weeks the patient has been having increasing difficulty getting up from sitting and walking with her walker. She has also been very somnolent and sleeps much of the day. She has no unilateral or focal weakness in any part of her body. She has a hx of falls but no falls since March. She denies dizziness, cp or palpitations. She denies any shortness of breath. Her daughter notes that the patient gets short of breath  when speaking, but not with activity. The patient has had no cough or fever. She has no difficulty laying flat and has no lower extremity swelling.  She has had no pain with urination, increase in frequency, or urgency. She has no wounds or ulcers.        The patient is at her baseline mental status as per her daughter Cheryl. Cheryl notes that the patient has a hx of dementia and has had a gradual cognitive decline over several years. Cheryl feels that her mother is not more confused than normal, but is notably weaker and more somnolent.        The patient was evaluated by her new geriatrician Dr. Florence, had an xray and labs drawn. She was treated for a presumptive pneumonia and completed a course of Levaquin on 7/4. She did not improve following treatment and was referred to the ED for further workup.        In the ED T99.4, HR 85, 131/76 RR16 95% on RA    Labs show WBC count 12.99 with neutrophil predominance, Sodium 127 on VBG, Normal UA, TSH 7.44    CTHead showed no signs of stroke or mass effect    CT chest with L upper lobe mass suspicious for malignancy        The patient was admitted to medicine for further workup of weakness.        The patient's hospital course was notable for an episode of hypertension at night after becoming upset about using a bedpan. The patient's blood pressure normalized after practicing relaxation techniques and deep breathing.    Repeat labs on 7/8 showed a decreased WBC 11.3, Na 128, T4 1.3, Serum Osm 263, and Urine Na 157 and Urine Osm 457. Blood cultures were pending        Based on clinical presentation, laboratory data, and imaging the decision was made to defer antibiotic treatment unless blood cx returned positive. After discussions with patient's family members the family opted to have the patient discharge with plans for Dr. Florence to follow up patients pending blood culture results.

## 2020-07-08 NOTE — PROGRESS NOTE ADULT - SUBJECTIVE AND OBJECTIVE BOX
Jordan Vergara MD PGY1  Pager [phone]    Patient is a 89y old  Female who presents with a chief complaint of Generalized Weakness (2020 16:27)      SUBJECTIVE/OVERNIGHT EVENTS: The patient had an episode of hypertension to 219/107 after becoming agitated about using a bed pan. Patient was given labetalol 5mg IV and coached on relaxation techniques. BP returned to baseline after the episode. The patient denies any complaints.      12 point review of symptoms negative except as above    MEDICATIONS  (STANDING):  cholecalciferol 1000 Unit(s) Oral daily  enoxaparin Injectable 40 milliGRAM(s) SubCutaneous daily  latanoprost 0.005% Ophthalmic Solution 1 Drop(s) Both EYES at bedtime  levothyroxine 50 MICROGram(s) Oral daily  sertraline 25 milliGRAM(s) Oral daily  sodium chloride 0.9%. 500 milliLiter(s) (50 mL/Hr) IV Continuous <Continuous>    MEDICATIONS  (PRN):    CAPILLARY BLOOD GLUCOSE        I&O's Summary    2020 07:01  -  2020 07:00  --------------------------------------------------------  IN: 1325 mL / OUT: 600 mL / NET: 725 mL    2020 07:01  -  2020 13:47  --------------------------------------------------------  IN: 0 mL / OUT: 300 mL / NET: -300 mL          Vital Signs Last 24 Hrs  T(C): 36.6 (2020 08:33), Max: 36.8 (2020 17:57)  T(F): 97.9 (2020 08:33), Max: 98.3 (2020 05:45)  HR: 77 (2020 08:33) (65 - 85)  BP: 168/77 (2020 08:33) (156/76 - 219/107)  BP(mean): --  RR: 18 (2020 08:33) (18 - 20)  SpO2: 96% (2020 08:33) (95% - 98%)    PHYSICAL EXAM:  GENERAL: NAD, well-developed  HEAD:  Atraumatic, Normocephalic  EYES: EOMI, PERRL, conjunctiva and sclera clear  NECK: Supple, No JVD  CHEST/LUNG: Clear to auscultation bilaterally; No wheeze  HEART: Regular rate and rhythm; No murmurs, rubs, or gallops  ABDOMEN: Soft, Nontender, Nondistended; Bowel sounds present  EXTREMITIES:  2+ Peripheral Pulses, No clubbing, cyanosis, or edema  PSYCH: mood and affect appropriate   NEUROLOGY: a+ox1, no facial droop, no slurred speech, no arm drift, no unilateral weakness  SKIN: No rashes or lesions    LABS                        11.0   11.32 )-----------( 252      ( 2020 06:32 )             34.1                         12.3   12.99 )-----------( 270      ( 2020 12:00 )             38.3     07-08    128<L>  |  94<L>  |  8   ----------------------------<  102<H>  3.7   |  26  |  0.49<L>  07-07    129<L>  |  94<L>  |  14  ----------------------------<  149<H>  4.1   |  24  |  0.52    Ca    8.3<L>      2020 06:32  Ca    8.8      2020 12:00  Phos  2.9     07-08  Mg     1.8     07-08    TPro  5.6<L>  /  Alb  2.7<L>  /  TBili  0.3  /  DBili  x   /  AST  18  /  ALT  8<L>  /  AlkPhos  59  07-08  TPro  6.4  /  Alb  3.1<L>  /  TBili  0.4  /  DBili  x   /  AST  22  /  ALT  10  /  AlkPhos  69  07-07          Urinalysis Basic - ( 2020 14:41 )    Color: Yellow / Appearance: Clear / S.022 / pH: x  Gluc: x / Ketone: Negative  / Bili: Negative / Urobili: Negative   Blood: x / Protein: Trace / Nitrite: Negative   Leuk Esterase: Negative / RBC: 4 /hpf / WBC 2 /HPF   Sq Epi: x / Non Sq Epi: 1 /hpf / Bacteria: Negative              RADIOLOGY & ADDITIONAL TESTS:

## 2020-07-08 NOTE — PROGRESS NOTE ADULT - PROBLEM SELECTOR PLAN 3
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

## 2020-07-08 NOTE — PROGRESS NOTE ADULT - PROBLEM SELECTOR PLAN 4
ct with left upper lobe consolidation  urine negative for legionella  completed course of levaquin on 7/4  no clinical signs of pneumonia  will monitor off abx

## 2020-07-08 NOTE — SWALLOW BEDSIDE ASSESSMENT ADULT - SWALLOW EVAL: RECOMMENDED FEEDING/EATING TECHNIQUES
SWALLOW TWO TIMES PER BOLUS/position upright (90 degrees)/small sips/bites/allow for swallow between intakes/alternate food with liquid/check mouth frequently for oral residue/pocketing

## 2020-07-08 NOTE — SWALLOW BEDSIDE ASSESSMENT ADULT - PHARYNGEAL PHASE
Slight intermittent wet vocal quality without secondary swallow/Multiple swallows Within functional limits Slight wet vocal quality/Multiple swallows

## 2020-07-08 NOTE — SWALLOW BEDSIDE ASSESSMENT ADULT - SWALLOW EVAL: DIAGNOSIS
Pt seen this date for clinical bedside swallow evaluation s/p admission for generalized weakness, r/o pneumonia. Today, Pt presented with suspected pharyngeal dysphagia characterized by reduced coordination of thin liquids with intermittent wet vocal quality and multiple swallows per bolus. Wet quality noted when Pt began talking post PO without secondary swallow. When taking cup sips, Pt with increased rate/amount of consumption. However, when provided a straw, Pt noted to take small single sips through straw. No overt, clinical s/s of aspiration/penetration noted w/ thin liquids this date. Discussed silent aspiration with Pt daughter; however, refusing objective testing at this time and requesting to monitor Pt clinically with strategies. Pt seen this date for clinical bedside swallow evaluation s/p admission for generalized weakness, r/o pneumonia. Today, Pt presented with suspected pharyngeal dysphagia characterized by reduced coordination of thin liquids with intermittent wet vocal quality and multiple swallows per bolus. Wet quality noted when Pt began talking post PO without secondary swallow. When taking cup sips, Pt with increased rate/amount of consumption. However, when provided a straw, Pt noted to take small single sips through straw. Discussed silent aspiration with Pt daughter; however, refusing objective testing at this time and requesting to monitor Pt clinically with strategies. Pt seen this date for clinical bedside swallow evaluation s/p admission for generalized weakness, r/o pneumonia. Today, Pt presented with suspected pharyngeal dysphagia characterized by intermittent wet vocal quality and multiple swallows per bolus. Wet quality noted when Pt began talking post PO without secondary swallow. When taking cup sips, Pt with increased rate/amount of consumption. However, when provided a straw, Pt noted to take small single sips through straw. Discussed silent aspiration with Pt daughter; however, refusing objective testing at this time and requesting to monitor Pt clinically with strategies. Pt seen for clinical bedside swallow evaluation s/p admission for generalized weakness, r/o pneumonia due to Chest CT and elevated WBC. Per daughter, Pt intermittently coughs at home with thin liquids via single cup sips due to increased rate/amount as Pt with difficulty self-feeding/tremor. Today, Pt with slight wet vocal quality at baseline, which was cleared with cued swallow. During PO trials, Pt presented with suspected pharyngeal dysphagia characterized by intermittent wet vocal quality and multiple swallows per bolus. Pt often began to talk prior to secondary swallow, which results in a slightly wet vocal quality. Vocal quality judged clear when cued to swallow. Pt with improved control of bolus rate/amount with straw sips this date. SLP discussed silent aspiration with Pt daughter; however, refused objective testing at this time and wishes to monitor Pt clinically with taught strategies. Pt seen for clinical bedside swallow evaluation s/p admission for generalized weakness, r/o pneumonia due to Chest CT and elevated WBC. Per daughter, Pt intermittently coughs at home with thin liquids via single cup sips due to increased rate/amount as Pt with difficulty self-feeding/tremor. Today, Pt with slight wet vocal quality at baseline, which was cleared with cued swallow. During PO trials, Pt presented with suspected pharyngeal dysphagia characterized by intermittent wet vocal quality and multiple swallows per bolus. Pt often began to talk prior to secondary swallow, which results in a slightly wet vocal quality. Vocal quality judged clear when cued to swallow. Pt with improved control of bolus rate/amount with straw sips this date. SLP discussed silent aspiration with Pt daughter; however, refused objective testing at this time and wishes to monitor Pt clinically with taught strategies. In addition, no significant difference with nectar, although pt only accepted two sips.

## 2020-07-08 NOTE — DISCHARGE NOTE PROVIDER - CARE PROVIDER_API CALL
Srinivas Florence  GERIATRIC MEDICINE  [address] [place] [id number]  Phone: [phone]  Fax: [phone]  Follow Up Time:

## 2020-07-08 NOTE — PROGRESS NOTE ADULT - PROBLEM SELECTOR PLAN 2
WBC down trending today 12.99--->11.3, overall downtrending from outpatient labs  Unclear etiology  UA normal  Will trend CBC  CT chest does not suggest pneumonia  UA is normal  f/u urine and blood cultures

## 2020-07-08 NOTE — SWALLOW BEDSIDE ASSESSMENT ADULT - SLP PERTINENT HISTORY OF CURRENT PROBLEM
89 year old woman, Holocaust survivor, with hx of dementia, hypothyroid, glaucoma, and hyponatremia presenting for generalized weakness. For the last 2-3 weeks the patient has been having increasing difficulty getting up from sitting and walking with her walker. She has also been very somnolent and sleeps much of the day. Her daughter, Cheryl, notes that the patient gets short of breath when speaking, but not with activity. The patient has had no cough or fever. Per Cheryl, Pt at baseline mental status and has had a gradual cognitive decline over several years related to dementia dx.

## 2020-07-08 NOTE — PHYSICAL THERAPY INITIAL EVALUATION ADULT - PERTINENT HX OF CURRENT PROBLEM, REHAB EVAL
89 y/o woman admitted to [facility] on 7/6/20, Holocaust survivor, with hx of dementia, hypothyroid, glaucoma, and hyponatremia presenting for generalized weakness x 2-3 weeks. Completed course of Levaquin outpatient for possible pneumonia with no improvement in symptoms. Patient with elevated WBC count 89 y/o woman admitted to [facility] on 7/6/20, Holocaust survivor, with hx of dementia, hypothyroid, glaucoma, and hyponatremia presenting for generalized weakness x 2-3 weeks. Completed course of Levaquin outpatient for possible pneumonia with no improvement in symptoms. Patient with elevated WBC count, CT head (-), CT A/P head

## 2020-07-08 NOTE — DISCHARGE NOTE PROVIDER - NSDCCPCAREPLAN_GEN_ALL_CORE_FT
PRINCIPAL DISCHARGE DIAGNOSIS  Diagnosis: Leukocytosis  Assessment and Plan of Treatment: You had an elevated white blood cell count that was measured from your blood sample. White blood cells can be elevated in the blood for many reasons including infection, dehydration, or certain medications.  Tests were done to look for infection in your lungs and in your urine. These tests showed no signs of infection. The results of the blood cultures looking for infection in the blood were not available at the time of your discharge.  If you experience fever, chills, pain with urination, increased frequency or  urgency of urination, cough, shortness of breath or increasing confusion contact your medical provider or return to the emergency department as these could be signs of an infection      SECONDARY DISCHARGE DIAGNOSES  Diagnosis: General weakness  Assessment and Plan of Treatment: PRINCIPAL DISCHARGE DIAGNOSIS  Diagnosis: Leukocytosis  Assessment and Plan of Treatment: You had an elevated white blood cell count that was measured from your blood sample. White blood cells can be elevated in the blood for many reasons including infection, dehydration, or certain medications.  Tests were done to look for infection in your lungs and in your urine. These tests showed no signs of infection. The results of the blood cultures looking for infection in the blood were not available at the time of your discharge.  If you experience fever, chills, pain with urination, increased frequency or  urgency of urination, cough, shortness of breath or increasing confusion contact your medical provider or return to the emergency department as these could be signs of an infection      SECONDARY DISCHARGE DIAGNOSES  Diagnosis: Syndrome of inappropriate ADH (SIADH) secretion  Assessment and Plan of Treatment: You have hyponatremia caused by excessive antidiuretic hormone production. You should follow up this diagnosis with your doctor for further instruction on the amount of fluids to drink in a day and or the need for additional salt or salt tablets in your diet

## 2020-07-08 NOTE — SWALLOW BEDSIDE ASSESSMENT ADULT - ASR SWALLOW ASPIRATION MONITOR
If noted:  D/C p.o. intake, provide non-oral nutrition/hydration/meds and consult this service @[id number]./pneumonia/throat clearing/fever/gurgly voice/change of breathing pattern/upper respiratory infection/cough

## 2020-07-08 NOTE — PHYSICAL THERAPY INITIAL EVALUATION ADULT - CRITERIA FOR SKILLED THERAPEUTIC INTERVENTIONS
home w/ home PT for progressive ambulation training, transfers, bed mobs, and safety assessment and has all necessary equip/ and receives Pt 3x weekly at her apt in [facility]/impairments found/anticipated discharge recommendation

## 2020-07-08 NOTE — PHYSICAL THERAPY INITIAL EVALUATION ADULT - ADDITIONAL COMMENTS
lives in [facility] + elevator, 24/7 A lives in [facility] + elevator, 24/7 HHA, had rolling walker, transport chair, grab bars, shower seat, commode,  and receives Pt 3x weekly/ hearing good, reading glassesEMERY

## 2020-07-08 NOTE — DISCHARGE NOTE PROVIDER - NSDCMRMEDTOKEN_GEN_ALL_CORE_FT
sertraline 25 mg oral tablet: 1 tab(s) orally once a day  Synthroid 50 mcg (0.05 mg) oral tablet: 1 tab(s) orally once a day  Xalatan 0.005% ophthalmic solution: 1 drop(s) to each affected eye once a day (in the evening)

## 2020-07-09 ENCOUNTER — TRANSCRIPTION ENCOUNTER (OUTPATIENT)
Age: 85
End: 2020-07-09

## 2020-07-11 LAB — T4 SERPL-MCNC: 4.9 UG/DL — SIGNIFICANT CHANGE UP

## 2020-07-12 LAB
CULTURE RESULTS: SIGNIFICANT CHANGE UP
CULTURE RESULTS: SIGNIFICANT CHANGE UP
SPECIMEN SOURCE: SIGNIFICANT CHANGE UP
SPECIMEN SOURCE: SIGNIFICANT CHANGE UP

## 2020-07-13 ENCOUNTER — LABORATORY RESULT (OUTPATIENT)
Age: 85
End: 2020-07-13

## 2020-07-23 ENCOUNTER — TRANSCRIPTION ENCOUNTER (OUTPATIENT)
Age: 85
End: 2020-07-23

## 2020-09-30 LAB
ALBUMIN SERPL ELPH-MCNC: 3.9 G/DL
ALP BLD-CCNC: 70 U/L
ALT SERPL-CCNC: 12 U/L
ANION GAP SERPL CALC-SCNC: 14 MMOL/L
AST SERPL-CCNC: 22 U/L
BASOPHILS # BLD AUTO: 0.04 K/UL
BASOPHILS NFR BLD AUTO: 0.5 %
BILIRUB SERPL-MCNC: 0.3 MG/DL
BUN SERPL-MCNC: 23 MG/DL
CALCIUM SERPL-MCNC: 8.9 MG/DL
CHLORIDE SERPL-SCNC: 95 MMOL/L
CO2 SERPL-SCNC: 23 MMOL/L
CREAT SERPL-MCNC: 0.7 MG/DL
EOSINOPHIL # BLD AUTO: 0.18 K/UL
EOSINOPHIL NFR BLD AUTO: 2.1 %
GLUCOSE SERPL-MCNC: 115 MG/DL
HCT VFR BLD CALC: 37.2 %
HGB BLD-MCNC: 11.2 G/DL
IMM GRANULOCYTES NFR BLD AUTO: 0.3 %
LYMPHOCYTES # BLD AUTO: 2.13 K/UL
LYMPHOCYTES NFR BLD AUTO: 24.8 %
MAN DIFF?: NORMAL
MCHC RBC-ENTMCNC: 27.7 PG
MCHC RBC-ENTMCNC: 30.1 GM/DL
MCV RBC AUTO: 91.9 FL
MONOCYTES # BLD AUTO: 0.77 K/UL
MONOCYTES NFR BLD AUTO: 9 %
NEUTROPHILS # BLD AUTO: 5.44 K/UL
NEUTROPHILS NFR BLD AUTO: 63.3 %
PLATELET # BLD AUTO: 193 K/UL
POTASSIUM SERPL-SCNC: 4.8 MMOL/L
PROT SERPL-MCNC: 6.6 G/DL
RBC # BLD: 4.05 M/UL
RBC # FLD: 16.3 %
SODIUM SERPL-SCNC: 132 MMOL/L
WBC # FLD AUTO: 8.59 K/UL

## 2020-11-09 PROBLEM — H40.9 UNSPECIFIED GLAUCOMA: Chronic | Status: ACTIVE | Noted: 2020-07-07

## 2020-11-09 PROBLEM — E87.1 HYPO-OSMOLALITY AND HYPONATREMIA: Chronic | Status: ACTIVE | Noted: 2020-07-07

## 2020-11-09 PROBLEM — F03.90 UNSPECIFIED DEMENTIA, UNSPECIFIED SEVERITY, WITHOUT BEHAVIORAL DISTURBANCE, PSYCHOTIC DISTURBANCE, MOOD DISTURBANCE, AND ANXIETY: Chronic | Status: ACTIVE | Noted: 2020-07-07

## 2020-11-09 PROBLEM — F41.9 ANXIETY DISORDER, UNSPECIFIED: Chronic | Status: ACTIVE | Noted: 2020-07-07

## 2020-11-09 PROBLEM — E03.9 HYPOTHYROIDISM, UNSPECIFIED: Chronic | Status: ACTIVE | Noted: 2020-07-07

## 2020-11-12 ENCOUNTER — APPOINTMENT (OUTPATIENT)
Dept: GERIATRICS | Facility: CLINIC | Age: 85
End: 2020-11-12
Payer: MEDICARE

## 2020-11-12 PROCEDURE — G0008: CPT

## 2020-11-12 PROCEDURE — 90662 IIV NO PRSV INCREASED AG IM: CPT

## 2020-11-30 ENCOUNTER — APPOINTMENT (OUTPATIENT)
Dept: GERIATRICS | Facility: CLINIC | Age: 85
End: 2020-11-30
Payer: MEDICARE

## 2020-11-30 ENCOUNTER — TRANSCRIPTION ENCOUNTER (OUTPATIENT)
Age: 85
End: 2020-11-30

## 2020-11-30 DIAGNOSIS — R41.82 ALTERED MENTAL STATUS, UNSPECIFIED: ICD-10-CM

## 2020-11-30 PROCEDURE — 99214 OFFICE O/P EST MOD 30 MIN: CPT | Mod: 95

## 2020-11-30 NOTE — PHYSICAL EXAM
[General Appearance - Alert] : alert [General Appearance - In No Acute Distress] : in no acute distress [General Appearance - Well Nourished] : well nourished [General Appearance - Well Developed] : well developed [Sclera] : the sclera and conjunctiva were normal [PERRL With Normal Accommodation] : pupils were equal in size, round, and reactive to light [Extraocular Movements] : extraocular movements were intact [Strabismus] : no strabismus was seen [FreeTextEntry1] : Tongue moist, no exudates [Neck Appearance] : the appearance of the neck was normal [Respiration, Rhythm And Depth] : normal respiratory rhythm and effort [Exaggerated Use Of Accessory Muscles For Inspiration] : no accessory muscle use [Edema] : there was no peripheral edema [No Focal Deficits] : no focal deficits

## 2020-11-30 NOTE — REVIEW OF SYSTEMS
[Fever] : no fever [Chills] : no chills [Feeling Poorly] : not feeling poorly [Feeling Tired] : feeling tired [As Noted in HPI] : as noted in HPI [Negative] : Psychiatric

## 2020-11-30 NOTE — HISTORY OF PRESENT ILLNESS
[Home] : at home, [unfilled] , at the time of the visit. [Medical Office: ([facility])___] : at the medical office located in  [Family Member] : family member [Formal Caregiver] : formal caregiver [Verbal consent obtained from patient] : the patient, [unfilled] [FreeTextEntry4] : Kuldeep Crowder [FreeTextEntry1] : Mrs. Crowder is a 89-year-old woman seen in telehealth visit, aide Terryalexandro present and son Ariel Crowder presents via a video link. \par Aide states that for about the past 2 weeks the patient seems to be weaker and walking less. She is not requiring more assistance to walk but generally has not had the same motivation. Aide also notes increased urge to void, especially at night . No complaint of change in bowel or bladder habits, no complaint of dysuria. Appetite seems to have increased, patient eats lunch well and has another full meal 3 hours later. No falls. No complaints of pain. No complaints of lethargy or slurred speech. Patient has dementia but does not appear to be more confused.\par Patient has not had any recent URI or febrile illness. No close contacts with COVID

## 2020-11-30 NOTE — REASON FOR VISIT
[Follow-Up] : a follow-up visit [Family Member] : family member [FreeTextEntry1] : dizziness, walking less

## 2020-12-04 ENCOUNTER — LABORATORY RESULT (OUTPATIENT)
Age: 85
End: 2020-12-04

## 2020-12-09 ENCOUNTER — NON-APPOINTMENT (OUTPATIENT)
Age: 85
End: 2020-12-09

## 2021-04-19 ENCOUNTER — RX RENEWAL (OUTPATIENT)
Age: 86
End: 2021-04-19

## 2021-05-19 ENCOUNTER — LABORATORY RESULT (OUTPATIENT)
Age: 86
End: 2021-05-19

## 2021-05-20 ENCOUNTER — LABORATORY RESULT (OUTPATIENT)
Age: 86
End: 2021-05-20

## 2021-05-20 LAB
APPEARANCE: ABNORMAL
BILIRUBIN URINE: NEGATIVE
BLOOD URINE: ABNORMAL
COLOR: YELLOW
GLUCOSE QUALITATIVE U: NEGATIVE
KETONES URINE: NEGATIVE
LEUKOCYTE ESTERASE URINE: ABNORMAL
NITRITE URINE: POSITIVE
PH URINE: 6
PROTEIN URINE: NORMAL
SPECIFIC GRAVITY URINE: 1.02
UROBILINOGEN URINE: NORMAL

## 2021-05-22 RX ORDER — LEVOFLOXACIN 500 MG/1
500 TABLET, FILM COATED ORAL DAILY
Qty: 7 | Refills: 0 | Status: COMPLETED | COMMUNITY
Start: 2020-06-27 | End: 2020-07-10

## 2021-06-14 ENCOUNTER — LABORATORY RESULT (OUTPATIENT)
Age: 86
End: 2021-06-14

## 2021-06-15 ENCOUNTER — LABORATORY RESULT (OUTPATIENT)
Age: 86
End: 2021-06-15

## 2021-06-18 LAB
APPEARANCE: ABNORMAL
BILIRUBIN URINE: NEGATIVE
BLOOD URINE: NEGATIVE
COLOR: NORMAL
GLUCOSE QUALITATIVE U: NEGATIVE
KETONES URINE: NEGATIVE
LEUKOCYTE ESTERASE URINE: ABNORMAL
NITRITE URINE: POSITIVE
PH URINE: 8.5
PROTEIN URINE: NORMAL
SPECIFIC GRAVITY URINE: 1.01
UROBILINOGEN URINE: NORMAL

## 2021-06-29 ENCOUNTER — APPOINTMENT (OUTPATIENT)
Dept: UROGYNECOLOGY | Facility: CLINIC | Age: 86
End: 2021-06-29
Payer: MEDICARE

## 2021-06-29 VITALS
BODY MASS INDEX: 20.38 KG/M2 | HEIGHT: 63 IN | DIASTOLIC BLOOD PRESSURE: 87 MMHG | WEIGHT: 115 LBS | HEART RATE: 79 BPM | SYSTOLIC BLOOD PRESSURE: 139 MMHG

## 2021-06-29 DIAGNOSIS — R35.1 NOCTURIA: ICD-10-CM

## 2021-06-29 DIAGNOSIS — Z78.9 OTHER SPECIFIED HEALTH STATUS: ICD-10-CM

## 2021-06-29 DIAGNOSIS — Z86.69 PERSONAL HISTORY OF OTHER DISEASES OF THE NERVOUS SYSTEM AND SENSE ORGANS: ICD-10-CM

## 2021-06-29 DIAGNOSIS — Z83.511 FAMILY HISTORY OF GLAUCOMA: ICD-10-CM

## 2021-06-29 DIAGNOSIS — N95.2 POSTMENOPAUSAL ATROPHIC VAGINITIS: ICD-10-CM

## 2021-06-29 DIAGNOSIS — R81 GLYCOSURIA: ICD-10-CM

## 2021-06-29 DIAGNOSIS — R30.0 DYSURIA: ICD-10-CM

## 2021-06-29 LAB
BILIRUB UR QL STRIP: NORMAL
CLARITY UR: CLEAR
COLLECTION METHOD: NORMAL
GLUCOSE UR-MCNC: 250
HCG UR QL: 5.5 EU/DL
HGB UR QL STRIP.AUTO: NORMAL
KETONES UR-MCNC: NORMAL
LEUKOCYTE ESTERASE UR QL STRIP: 0.2
NITRITE UR QL STRIP: NORMAL
PH UR STRIP: 1.02
PROT UR STRIP-MCNC: NORMAL
SP GR UR STRIP: 1.02

## 2021-06-29 PROCEDURE — 51701 INSERT BLADDER CATHETER: CPT

## 2021-06-29 PROCEDURE — 99204 OFFICE O/P NEW MOD 45 MIN: CPT | Mod: 25

## 2021-06-29 PROCEDURE — 81003 URINALYSIS AUTO W/O SCOPE: CPT | Mod: QW

## 2021-06-29 NOTE — HISTORY OF PRESENT ILLNESS
[Cystocele (Obstetric)] : no [Vaginal Wall Prolapse] : no [Unable To Restrain Bowel Movement] : mild [Urinary Frequency] : mild [Feelings Of Urinary Urgency] : severe [Hematuria] : no [] : months ago [Constipation Obstructed Defecation] : no [Vaginal Pain] : no [de-identified] : only when an active UTI [de-identified] : only with UTI [de-identified] : hourly [de-identified] : 6-8x per night [de-identified] : 2 [FreeTextEntry1] : Genie is a 89 yo presenting for recurrent UTIs and urinary frequency.  She has had 3 UTIs in the past 6 months.  In the past two months her daily urinary frequency has increased to hourly and she is getting up to void 6-8 times per night.  Family notes she has dementia and may not be voiding each time she gets up at night to go to the bathroom.  Currently no dysuria. \par \par Cultures reviewed in EMR.  Patient with E Coli UTI pansensitive on 6/15/21 (prescribed cephalexin course), 5/20/21 E coli pansensitive, and 12/04/20 E coli UTI some resistance.  \par \par PMH: hypothyrodism, depression, glaucoma (does not recall if open or closed angle glaucoma), 3xNSVD, dementia\par PSH: total abdominal hysterectomy 1975 for fibroids, total hip replacement 9/2001\par SH: has caregiver\par

## 2021-06-29 NOTE — PHYSICAL EXAM
[Chaperone Present] : A chaperone was present in the examining room during all aspects of the physical examination [No Acute Distress] : in no acute distress [Well developed] : well developed [Well Nourished] : ~L well nourished [Normal Mood/Affect] : mood and affect are normal [Bulbocavernous] : bulbocavernous was present [Primitive Reflexes] : primitive reflexes were present [Warm and Dry] : was warm and dry to touch [Normal Gait] : gait was normal [Labia Majora] : were normal [Labia Minora] : were normal [Normal Appearance] : general appearance was normal [No Bleeding] : there was no active vaginal bleeding [Normal Position] : in a normal position [Uterine Adnexae] : were not tender and not enlarged [Lesion (___ mm)] : had a [unfilled] ~Umm lesion [Normal] : no abnormalities [Post Void Residual ____ml] : post void residual was [unfilled] ml [Exam Deferred] : was deferred [Mass (___ Cm)] : no ~M [unfilled] abdominal mass was palpated [Tenderness] : ~T no ~M abdominal tenderness observed [Distended] : not distended [Inguinal LAD] : no adenopathy was noted in the inguinal lymph nodes [de-identified] : no prolapse

## 2021-06-29 NOTE — DISCUSSION/SUMMARY
[FreeTextEntry1] : Genie is a 89 yo with recurrent UTIs, vaginal atrophy and urethral caruncle. \par I reviewed the findings with the patient's daughter as well as her son that was called via telephone.\par We discussed behavioral modification techniques to prevent recurrent UTIs.  Risks and benefits of vaginal estrogen discussed with patient.  Patient desires to start vaginal estrogen, prescribed.  Requested that patient return to our office if further symptoms for cath for sterile urine collection.  IUGA information on on vaginal estrogen given to patient.  \par \par Discussed urethral caruncle.  To apply vaginal estrogen to caruncle.  \par We also discussed the glucosuria and possible development of diabetes which may be contributing to her symptoms as well. We discussed following up with her medical doctor for such.\par \par To follow up in 6-8 weeks.\par

## 2021-06-29 NOTE — REASON FOR VISIT
[Initial Visit ___] : an initial visit for [unfilled] [Questionnaire Received] : Patient questionnaire received [Intake Form Reviewed] : Patient intake form with past medical history, surgical history, family history and social history reviewed today [Family Member] : family member [Formal Caregiver] : formal caregiver

## 2021-06-30 PROBLEM — Z86.69 HISTORY OF GLAUCOMA: Status: RESOLVED | Noted: 2021-06-30 | Resolved: 2021-06-30

## 2021-06-30 PROBLEM — Z78.9 CAFFEINE USE: Status: ACTIVE | Noted: 2021-06-30

## 2021-06-30 PROBLEM — Z83.511 FAMILY HISTORY OF GLAUCOMA: Status: ACTIVE | Noted: 2021-06-30

## 2021-08-19 ENCOUNTER — APPOINTMENT (OUTPATIENT)
Dept: UROGYNECOLOGY | Facility: CLINIC | Age: 86
End: 2021-08-19
Payer: MEDICARE

## 2021-08-19 LAB
BILIRUB UR QL STRIP: NORMAL
CLARITY UR: CLEAR
COLLECTION METHOD: NORMAL
GLUCOSE UR-MCNC: NORMAL
HCG UR QL: 0.2 EU/DL
HGB UR QL STRIP.AUTO: NORMAL
KETONES UR-MCNC: NORMAL
LEUKOCYTE ESTERASE UR QL STRIP: NORMAL
NITRITE UR QL STRIP: NORMAL
PH UR STRIP: 5
PROT UR STRIP-MCNC: NORMAL
SP GR UR STRIP: 1.02

## 2021-08-19 PROCEDURE — 81003 URINALYSIS AUTO W/O SCOPE: CPT | Mod: QW

## 2021-08-19 PROCEDURE — 99213 OFFICE O/P EST LOW 20 MIN: CPT

## 2021-08-19 NOTE — PHYSICAL EXAM
[No Acute Distress] : in no acute distress [Normal Mood/Affect] : mood and affect are normal [Soft, Nontender] : the abdomen was soft and nontender [Vulvar Atrophy] : vulvar atrophy [Labia Majora Erythema] : erythema [de-identified] : Gait very slow but steady with rolling walker.

## 2021-08-19 NOTE — DISCUSSION/SUMMARY
[FreeTextEntry1] : Urine dip negative.\par Continue with Estradiol cream BIW.  \par Reinforced daily pericare.\par \par IF pt have any problems/concern to call office and follow up.  Daughter and friend verbalizes understanding and agrees.

## 2021-08-19 NOTE — PROCEDURE
[Straight Catheterization] : insertion of a straight catheter [Urinary Frequency] : urinary frequency [Patient] : the patient [Other: ___] : [unfilled] [___ Fr Straight Tip] : a [unfilled] in [place] straight tip catheter [Clear] : clear [No Complications] : no complications [Tolerated Well] : the patient tolerated the procedure well [de-identified] : PVR 10mL [FreeTextEntry1] : Unable to obtain clean catch due to vaginal atrophy.\par Urine obtained via catheter.  Urethra cleared, catheter passed.  No CVAT.

## 2021-08-19 NOTE — HISTORY OF PRESENT ILLNESS
[FreeTextEntry1] : Genie, 89y/o presents to the office for follow up on recurrent UTI and vaginal atrophy.  PT had hx of UTI via clean catch.  First initial visit, pt was negative for UTI on 6/29/2021.  She have been using the Estradiol cream as Rx.  The past week, pt did not cream applied to area.  Denies any fever, chills, back pain, or blood in urine.  PT does have urinary frequency.

## 2021-08-20 ENCOUNTER — NON-APPOINTMENT (OUTPATIENT)
Age: 86
End: 2021-08-20

## 2021-08-31 ENCOUNTER — NON-APPOINTMENT (OUTPATIENT)
Age: 86
End: 2021-08-31

## 2021-09-03 ENCOUNTER — APPOINTMENT (OUTPATIENT)
Dept: UROGYNECOLOGY | Facility: CLINIC | Age: 86
End: 2021-09-03

## 2021-09-20 ENCOUNTER — APPOINTMENT (OUTPATIENT)
Dept: UROGYNECOLOGY | Facility: CLINIC | Age: 86
End: 2021-09-20
Payer: MEDICARE

## 2021-09-20 PROCEDURE — 99214 OFFICE O/P EST MOD 30 MIN: CPT

## 2021-09-20 RX ORDER — ESTRADIOL 0.1 MG/G
0.1 CREAM VAGINAL
Qty: 1 | Refills: 1 | Status: DISCONTINUED | COMMUNITY
Start: 2021-06-29 | End: 2021-09-20

## 2021-09-20 NOTE — PHYSICAL EXAM
[No Acute Distress] : in no acute distress [Well developed] : well developed [Well Nourished] : ~L well nourished [Good Hygeine] : demonstrates good hygeine [Normal Mood/Affect] : mood and affect are normal [Warm and Dry] : was warm and dry to touch [Normal Gait] : gait was normal [Vulvar Atrophy] : vulvar atrophy [Labia Majora] : were normal [Atrophy] : atrophy [Labia Minora] : were normal [No Bleeding] : there was no active vaginal bleeding

## 2021-09-23 NOTE — DISCUSSION/SUMMARY
[FreeTextEntry1] : Pt w/ hx VVA/AR's for Estring insertion today-pt tolerated well\par Advised avoid constipation/straining w/ daily fiber/fluid intake and stool softeners daily PRN\par Con't daily proper pericare\par RTO x12 weeks for insertion of new Estring or prn\par \par Instructed pt to call the office if any problems or concerns and she verbalized understanding.\par

## 2021-09-23 NOTE — HISTORY OF PRESENT ILLNESS
[FreeTextEntry1] : Pt w/ known hx Dementia, VVA, AR's presents accompanied by HHA and daughter for insertion of Estring. Pt non compliant with application of Estrace cream PV BIW HS assisted by aide. Presently without any c/o pains, urinary discomforts.

## 2021-09-23 NOTE — PROCEDURE
[New] : new [None] : no bleeding [Medication Review] : Medicaiton Review: Patient verbalizes understanding of risks and benefits [Fluid Management] : Fluid Management: patient verbalizes understanding 6-10 cups per day [Bowel Management] : Bowel Management: patient verbalizes understanding of daily dietary fiber intake [Bladder Training] : Bladder Training: Patient given information with verbal understanding [FreeTextEntry2] : New Estring ring inserted vaginally [FreeTextEntry8] : Con't daily proper pericare

## 2021-11-03 ENCOUNTER — APPOINTMENT (OUTPATIENT)
Dept: GERIATRICS | Facility: CLINIC | Age: 86
End: 2021-11-03
Payer: MEDICARE

## 2021-11-03 VITALS
DIASTOLIC BLOOD PRESSURE: 80 MMHG | RESPIRATION RATE: 16 BRPM | SYSTOLIC BLOOD PRESSURE: 142 MMHG | WEIGHT: 113.5 LBS | OXYGEN SATURATION: 96 % | HEART RATE: 74 BPM | TEMPERATURE: 98 F | BODY MASS INDEX: 20.11 KG/M2 | HEIGHT: 63 IN

## 2021-11-03 DIAGNOSIS — Z00.00 ENCOUNTER FOR GENERAL ADULT MEDICAL EXAMINATION W/OUT ABNORMAL FINDINGS: ICD-10-CM

## 2021-11-03 PROCEDURE — 99497 ADVNCD CARE PLAN 30 MIN: CPT

## 2021-11-03 PROCEDURE — G0439: CPT

## 2021-11-03 PROCEDURE — 90662 IIV NO PRSV INCREASED AG IM: CPT

## 2021-11-03 PROCEDURE — G0008: CPT

## 2021-11-03 RX ORDER — MUPIROCIN 20 MG/G
2 OINTMENT TOPICAL TWICE DAILY
Qty: 2 | Refills: 3 | Status: DISCONTINUED | COMMUNITY
Start: 2017-10-06 | End: 2021-11-03

## 2021-11-03 RX ORDER — TRIAMCINOLONE ACETONIDE 1 MG/G
0.1 OINTMENT TOPICAL
Qty: 1 | Refills: 2 | Status: DISCONTINUED | COMMUNITY
Start: 2017-11-10 | End: 2021-11-03

## 2021-11-03 RX ORDER — NITROFURANTOIN (MONOHYDRATE/MACROCRYSTALS) 25; 75 MG/1; MG/1
100 CAPSULE ORAL
Qty: 14 | Refills: 0 | Status: DISCONTINUED | COMMUNITY
Start: 2021-05-22 | End: 2021-11-03

## 2021-11-03 RX ORDER — FENUGREEK SEED/BL.THISTLE/ANIS 340 MG
CAPSULE ORAL
Qty: 7 | Refills: 0 | Status: DISCONTINUED | COMMUNITY
Start: 2020-06-27 | End: 2021-11-03

## 2021-11-03 NOTE — HISTORY OF PRESENT ILLNESS
[Advanced Directives Discussed] : discussed at today's visit [Retired] : retired from work [None] : The patient has no concerns about alcohol abuse [Medication and Allergies Reconciled] : medication and allergies reconciled [Compliant with medications] : compliant with medications [Children] : children [Does not drive] : does not drive [No history of falls] : no history of falls [0] : 0 [Smoking Status Reviewed and Updated] : Smoking status was reviewed and updated [Needs some help with ADLs] : need some help with ADLs [Seatbelts] : seatbelts [Smoke Detectors] : smoke detectors [Bathroom Grab Bars] : bathroom grab bars [No falls in past year] : Patient reported no falls in the past year [Independent] : transferring/mobility [Full assistance needed] : Assistance needed managing medications [] : Assistance needed managing finances. [Never] : has never used illicit drugs [Walker] : walker [Wheelchair] : wheelchair [Smoke Detector] : smoke detector [Grab Bars] : grab bars [Shower Chair] : shower chair [Wears Seat Belt] : wears seat belt [Over the Past 2 Weeks, Have You Felt Down, Depressed, or Hopeless?] : 1.) Over the past 2 weeks, have you felt down, depressed, or hopeless? No [Over the Past 2 Weeks, Have You Felt Little Interest or Pleasure Doing Things?] : 2.) Over the past 2 weeks, have you felt little interest or pleasure doing things? No [de-identified] : lives with caregiver [FreeTextEntry2] : n/a [de-identified] : nonsmoker [de-identified] : Discussed with daughter in person and son Ariel over the phone. Family reports that patient has a living will. Currently 3 siblings are health care proxy. Molst form given to family to discuss among themselves. Will follow-up over the phone after family discussion. [FreeTextEntry1] : Mrs. Crowder is a 89-year-old woman presenting for an annual physical exam. Patient is accompanied by daughter and aide to the visit. Reports feeling well, no pain or complaints at this time. Family reports that patient has frequent UTI so recently got vaginal ring inserted by gyn uro. Aide reports that sometimes patient has urinary incontinence at night but nothing troublesome. Good appetite and mood. No falls. Got booster shot on 9/8. Hasn't had flu shot. Walks with walker. Patient has hx of dementia and has been stable per aide and daughter. [FreeTextEntry8] : occasional incontinence at night

## 2021-11-03 NOTE — END OF VISIT
[] : Resident [FreeTextEntry3] : Mrs. Crowder was seen with Dr. Brooks, R3. I obtained a detailed interval history and personally examined the patient. I discussed my findings and plan with the patient, her daughter and the resident, reviewed the resident note and agree with assessment and plan. Overall the patient is doing well. I discussed advanced directives with her daughter and her son Ariel over the phone. I reviewed the components of a MOLST form, and the siblings will discuss amongst themselves and call me. I told them if they are in agreement, I can take a verbal consent over the phone. I spent an additional 20 minutes and ACP discussion

## 2021-11-03 NOTE — REVIEW OF SYSTEMS
[Incontinence] : incontinence [Fever] : no fever [Chills] : no chills [Eye Pain] : no eye pain [Limb Pain] : no limb pain [Dizziness] : no dizziness [Anxiety] : no anxiety [Depression] : no depression [FreeTextEntry8] : at night

## 2021-11-03 NOTE — REASON FOR VISIT
[Family Member] : family member [Other: _____] : [unfilled] [Other: _________] : [unfilled] [FreeTextEntry1] : annual wellness visit

## 2021-11-03 NOTE — PHYSICAL EXAM
[Alert] : alert [No Acute Distress] : in no acute distress [Sclera] : the sclera and conjunctiva were normal [Normal Outer Ear/Nose] : the ears and nose were normal in appearance [No Respiratory Distress] : no respiratory distress [No Acc Muscle Use] : no accessory muscle use [Normal S1, S2] : normal S1 and S2 [Heart Rate And Rhythm] : heart rate was normal and rhythm regular [Abdomen Tenderness] : non-tender [Abdomen Soft] : soft [No Spinal Tenderness] : no spinal tenderness [Motor Tone] : muscle strength and tone were normal [Normal Affect] : the affect was normal [Normal Mood] : the mood was normal [de-identified] : systolic murmurs noted [de-identified] : strength intact [de-identified] : P

## 2021-11-03 NOTE — ASSESSMENT
[FreeTextEntry1] : #health care maintenance\par -UTD with COVID booster\par -flu shot today\par -check comprehensive labs: cbc, cmp, a1c, tsh, b12, folate, vit D\par \par Patient seen and examined with Dr. Florence.

## 2021-11-08 ENCOUNTER — RX RENEWAL (OUTPATIENT)
Age: 86
End: 2021-11-08

## 2021-11-11 LAB
25(OH)D3 SERPL-MCNC: 23.4 NG/ML
ALBUMIN SERPL ELPH-MCNC: 4.1 G/DL
ALP BLD-CCNC: 71 U/L
ALT SERPL-CCNC: 9 U/L
ANION GAP SERPL CALC-SCNC: 13 MMOL/L
AST SERPL-CCNC: 20 U/L
BASOPHILS # BLD AUTO: 0.05 K/UL
BASOPHILS NFR BLD AUTO: 0.8 %
BILIRUB SERPL-MCNC: 0.5 MG/DL
BUN SERPL-MCNC: 22 MG/DL
CALCIUM SERPL-MCNC: 9 MG/DL
CHLORIDE SERPL-SCNC: 100 MMOL/L
CO2 SERPL-SCNC: 22 MMOL/L
CREAT SERPL-MCNC: 0.73 MG/DL
EOSINOPHIL # BLD AUTO: 0.19 K/UL
EOSINOPHIL NFR BLD AUTO: 2.9 %
ESTIMATED AVERAGE GLUCOSE: 126 MG/DL
FOLATE SERPL-MCNC: >20 NG/ML
GLUCOSE SERPL-MCNC: 128 MG/DL
HBA1C MFR BLD HPLC: 6 %
HCT VFR BLD CALC: 38.3 %
HGB BLD-MCNC: 12.6 G/DL
IMM GRANULOCYTES NFR BLD AUTO: 0.3 %
LYMPHOCYTES # BLD AUTO: 1.99 K/UL
LYMPHOCYTES NFR BLD AUTO: 30.9 %
MAN DIFF?: NORMAL
MCHC RBC-ENTMCNC: 30.2 PG
MCHC RBC-ENTMCNC: 32.9 GM/DL
MCV RBC AUTO: 91.8 FL
MONOCYTES # BLD AUTO: 0.39 K/UL
MONOCYTES NFR BLD AUTO: 6 %
NEUTROPHILS # BLD AUTO: 3.81 K/UL
NEUTROPHILS NFR BLD AUTO: 59.1 %
PLATELET # BLD AUTO: 187 K/UL
POTASSIUM SERPL-SCNC: 4.4 MMOL/L
PROT SERPL-MCNC: 6.6 G/DL
RBC # BLD: 4.17 M/UL
RBC # FLD: 13.3 %
SODIUM SERPL-SCNC: 135 MMOL/L
TSH SERPL-ACNC: 4 UIU/ML
VIT B12 SERPL-MCNC: 1238 PG/ML
WBC # FLD AUTO: 6.45 K/UL

## 2021-12-13 ENCOUNTER — APPOINTMENT (OUTPATIENT)
Dept: UROGYNECOLOGY | Facility: CLINIC | Age: 86
End: 2021-12-13
Payer: MEDICARE

## 2021-12-13 PROCEDURE — 99213 OFFICE O/P EST LOW 20 MIN: CPT

## 2021-12-20 ENCOUNTER — RX RENEWAL (OUTPATIENT)
Age: 86
End: 2021-12-20

## 2022-03-14 ENCOUNTER — APPOINTMENT (OUTPATIENT)
Dept: UROGYNECOLOGY | Facility: CLINIC | Age: 87
End: 2022-03-14
Payer: MEDICARE

## 2022-03-14 PROCEDURE — 99213 OFFICE O/P EST LOW 20 MIN: CPT

## 2022-05-23 ENCOUNTER — RX RENEWAL (OUTPATIENT)
Age: 87
End: 2022-05-23

## 2022-06-13 ENCOUNTER — NON-APPOINTMENT (OUTPATIENT)
Age: 87
End: 2022-06-13

## 2022-06-14 ENCOUNTER — APPOINTMENT (OUTPATIENT)
Dept: UROGYNECOLOGY | Facility: CLINIC | Age: 87
End: 2022-06-14
Payer: MEDICARE

## 2022-06-14 DIAGNOSIS — N36.2 URETHRAL CARUNCLE: ICD-10-CM

## 2022-06-14 PROCEDURE — 99213 OFFICE O/P EST LOW 20 MIN: CPT

## 2022-06-15 NOTE — PHYSICAL EXAM
[No Acute Distress] : in no acute distress [Well developed] : well developed [Well Nourished] : ~L well nourished [Good Hygeine] : demonstrates good hygeine [Normal Mood/Affect] : mood and affect are normal [Warm and Dry] : was warm and dry to touch [Vulvar Atrophy] : vulvar atrophy [Labia Majora] : were normal [Labia Minora] : were normal [Oriented x3] : disoriented to person, place, or time [Normal Memory] : ~T memory was ~L impaired [Normal Gait] : gait was abnormal [FreeTextEntry4] : Patient did not tolerate speculum exam

## 2022-06-15 NOTE — HISTORY OF PRESENT ILLNESS
[FreeTextEntry1] : Genie is a 89 year old with known history of dementia, vulvovaginal atrophy, and recurrent UTIs with estring in place.  She is doing very well with the Estring in place.   \par Pt's Dementia status quo.

## 2022-06-15 NOTE — PROCEDURE
[New] : new [None] : no bleeding [FreeTextEntry2] : Old estring removed without complication, New Estring ring inserted vaginally [FreeTextEntry8] : Con't daily proper pericare

## 2022-06-17 ENCOUNTER — LABORATORY RESULT (OUTPATIENT)
Age: 87
End: 2022-06-17

## 2022-06-25 ENCOUNTER — NON-APPOINTMENT (OUTPATIENT)
Age: 87
End: 2022-06-25

## 2022-07-12 ENCOUNTER — LABORATORY RESULT (OUTPATIENT)
Age: 87
End: 2022-07-12

## 2022-07-13 LAB
APPEARANCE: ABNORMAL
BILIRUBIN URINE: NEGATIVE
BLOOD URINE: NEGATIVE
COLOR: NORMAL
GLUCOSE QUALITATIVE U: NEGATIVE
KETONES URINE: NEGATIVE
LEUKOCYTE ESTERASE URINE: ABNORMAL
NITRITE URINE: POSITIVE
PH URINE: 7.5
PROTEIN URINE: NEGATIVE
SPECIFIC GRAVITY URINE: 1.01
UROBILINOGEN URINE: NORMAL

## 2022-09-13 ENCOUNTER — APPOINTMENT (OUTPATIENT)
Dept: UROGYNECOLOGY | Facility: CLINIC | Age: 87
End: 2022-09-13

## 2022-09-13 DIAGNOSIS — R35.0 FREQUENCY OF MICTURITION: ICD-10-CM

## 2022-09-13 LAB
BILIRUB UR QL STRIP: NEGATIVE
CLARITY UR: NORMAL
COLLECTION METHOD: NORMAL
GLUCOSE UR-MCNC: NEGATIVE
HCG UR QL: 0.2 EU/DL
HGB UR QL STRIP.AUTO: NORMAL
KETONES UR-MCNC: NEGATIVE
LEUKOCYTE ESTERASE UR QL STRIP: NORMAL
NITRITE UR QL STRIP: POSITIVE
PH UR STRIP: 5.5
PROT UR STRIP-MCNC: NEGATIVE
SP GR UR STRIP: 1.02

## 2022-09-13 PROCEDURE — 81003 URINALYSIS AUTO W/O SCOPE: CPT | Mod: QW

## 2022-09-13 PROCEDURE — 99214 OFFICE O/P EST MOD 30 MIN: CPT | Mod: 25

## 2022-09-13 PROCEDURE — 51701 INSERT BLADDER CATHETER: CPT

## 2022-09-13 NOTE — REASON FOR VISIT
[Follow-Up Visit_____] : a follow-up visit for [unfilled] [Family Member] : family member [Formal Caregiver] : formal caregiver

## 2022-09-16 ENCOUNTER — RX RENEWAL (OUTPATIENT)
Age: 87
End: 2022-09-16

## 2022-09-16 NOTE — PHYSICAL EXAM
[No Acute Distress] : in no acute distress [Well developed] : well developed [Well Nourished] : ~L well nourished [Good Hygeine] : demonstrates good hygeine [Normal Mood/Affect] : mood and affect are normal [None] : no CVA tenderness [Warm and Dry] : was warm and dry to touch [Vulvar Atrophy] : vulvar atrophy [Labia Majora] : were normal [Labia Minora] : were normal [Atrophy] : atrophy [Dry Mucosa] : dry mucosa [No Bleeding] : there was no active vaginal bleeding [Oriented x3] : disoriented to person, place, or time [Normal Memory] : ~T memory was ~L impaired [Tenderness] : ~T no ~M abdominal tenderness observed [Distended] : not distended [de-identified] : Pt in a wheelchair

## 2022-09-16 NOTE — DISCUSSION/SUMMARY
[FreeTextEntry1] :  Vulvovaginal atrophy:\par -Estring removal and new one inserted.\par \par Urinary frequency:\par -Udip shows trace blood, positive nitrites, moderate leukocytes. Pt treated with Nitrofurantoin based on positive Udip and symptoms.\par -Formal UA and C/S sent to lab\par -Advised daughter to call the office if pt's symptoms persist or worsen\par \par -RTO in 3 months or sooner for new estring insertion\par Instructed pt's daughter to call the office if any problems or concerns and she verbalized understanding.\par

## 2022-09-16 NOTE — PROCEDURE
[Straight Catheterization] : insertion of a straight catheter [Urinary Frequency] : urinary frequency [Guardian] : the guardian [Allergies Reviewed] : Allergies reviewed [___ Fr Straight Tip] : a [unfilled] in [place] straight tip catheter [Cloudy] : cloudy [Culture] : culture [Urinalysis] : urinalysis [No Complications] : no complications [Tolerated Well] : the patient tolerated the procedure well [Post procedure instructions and information given] : Post procedure instructions and information were given and reviewed with patient. [New] : new [None] : no bleeding [FreeTextEntry2] : Old estring removed without complication, New Estring ring inserted vaginally [FreeTextEntry8] : Con't daily proper pericare

## 2022-09-16 NOTE — HISTORY OF PRESENT ILLNESS
[FreeTextEntry1] : Genie is a 89 year old with known history of dementia, vulvovaginal atrophy, and recurrent UTIs with estring in place.  She is doing very well with the Estring in place.  Her daughter reports that her mother has had increased urinary frequency over the last few days. Her daughter would like her to have a Udip today. Pt has no fever, chills or back pain. \par

## 2022-09-20 ENCOUNTER — NON-APPOINTMENT (OUTPATIENT)
Age: 87
End: 2022-09-20

## 2022-09-20 LAB
APPEARANCE: ABNORMAL
BACTERIA: ABNORMAL
BILIRUBIN URINE: NEGATIVE
BLOOD URINE: NORMAL
COLOR: YELLOW
GLUCOSE QUALITATIVE U: NORMAL
HYALINE CASTS: 0 /LPF
KETONES URINE: NEGATIVE
LEUKOCYTE ESTERASE URINE: ABNORMAL
MICROSCOPIC-UA: NORMAL
NITRITE URINE: POSITIVE
PH URINE: 6
PROTEIN URINE: ABNORMAL
RED BLOOD CELLS URINE: 2 /HPF
SPECIFIC GRAVITY URINE: 1.02
SQUAMOUS EPITHELIAL CELLS: 2 /HPF
URINE COMMENTS: NORMAL
UROBILINOGEN URINE: NORMAL
WHITE BLOOD CELLS URINE: 75 /HPF

## 2022-10-06 NOTE — DISCHARGE NOTE NURSING/CASE MANAGEMENT/SOCIAL WORK - NSDCPETBCESMAN_GEN_ALL_CORE
Hospitalist
If you are a smoker, it is important for your health to stop smoking. Please be aware that second hand smoke is also harmful.

## 2022-10-14 ENCOUNTER — NON-APPOINTMENT (OUTPATIENT)
Age: 87
End: 2022-10-14

## 2022-10-23 ENCOUNTER — NON-APPOINTMENT (OUTPATIENT)
Age: 87
End: 2022-10-23

## 2022-10-24 ENCOUNTER — APPOINTMENT (OUTPATIENT)
Dept: UROGYNECOLOGY | Facility: CLINIC | Age: 87
End: 2022-10-24

## 2022-10-24 VITALS
TEMPERATURE: 96.8 F | WEIGHT: 110 LBS | HEIGHT: 63 IN | BODY MASS INDEX: 19.49 KG/M2 | DIASTOLIC BLOOD PRESSURE: 84 MMHG | SYSTOLIC BLOOD PRESSURE: 138 MMHG

## 2022-10-24 PROCEDURE — 99214 OFFICE O/P EST MOD 30 MIN: CPT

## 2022-10-24 NOTE — PROCEDURE
[Follow Up] : follow up [Good Fit] : fits well [Pessary Inserted] : inserted [Pessary Out] : removed [Refit] : refit is not needed [Erosion] : no evidence of erosion [FreeTextEntry2] : r

## 2022-10-24 NOTE — REASON FOR VISIT
[________] : [unfilled] [Follow-Up Visit_____] : a follow-up visit for [unfilled] [Formal Caregiver] : formal caregiver [Other: _____] : [unfilled]

## 2022-10-24 NOTE — END OF VISIT
[FreeTextEntry3] : pt seen and examined, I have reviewed the above and agree with all pertinent clinical information including history and physical examination and agree with treatment plan.\par

## 2022-10-24 NOTE — HISTORY OF PRESENT ILLNESS
[FreeTextEntry1] : Genie is a 89F w/ hx of dementia, vulvovaginal atrophy, recurrent UTIs with Estring in place (last exchanged 9/13/2022) here for bleeding.  Genie is assisted by her daughter and aide who provided the history. Patient has had 5-7 episodes of bleeding (unclear if vaginal bleeding or hematuria) since her last visit in September.  Over the past weekend, the patient was bleeding (with some clots) onto her adult diapers and into the toilet throughout the day.  No fevers or change in mental status/behavior from baseline.

## 2022-10-24 NOTE — PHYSICAL EXAM
[Chaperone Present] : A chaperone was present in the examining room during all aspects of the physical examination [No Acute Distress] : in no acute distress [Well developed] : well developed [Well Nourished] : ~L well nourished [Respirations regular] : ~T respiratory rate was regular [Warm and Dry] : was warm and dry to touch [Normal] : normal external genitalia [Labia Majora] : were normal [Labia Minora] : were normal [Mucosal Prolapse] : had a mucosal prolapse [Atrophy] : atrophy [Oriented x3] : disoriented to person, place, or time [Normal Memory] : ~T memory was ~L impaired [Tenderness] : ~T no ~M abdominal tenderness observed [Distended] : not distended [FreeTextEntry3] : erythematous urethral prolapse with some areas of irritation; no active bleeding

## 2022-10-24 NOTE — DISCUSSION/SUMMARY
[FreeTextEntry1] : 1) Urethral prolapse\par -Apply vaginal estrogen to urethral meatus nightly\par -RTO in one month to assess symptoms and repeat physical exam\par -Call office if bleeding worsens or any other acute changes\par -Patients' daughter and caregiver expressed understanding.  All questions answered.\par \par 2) Vulvovaginal atrophy\par -Estring removed, cleaned, and reinserted.  Maintain in place\par -Will need to be exchanged in 3 months.

## 2022-11-28 ENCOUNTER — APPOINTMENT (OUTPATIENT)
Dept: UROGYNECOLOGY | Facility: CLINIC | Age: 87
End: 2022-11-28

## 2022-11-28 DIAGNOSIS — N39.0 URINARY TRACT INFECTION, SITE NOT SPECIFIED: ICD-10-CM

## 2022-11-28 PROCEDURE — 99213 OFFICE O/P EST LOW 20 MIN: CPT

## 2022-11-28 NOTE — DISCUSSION/SUMMARY
[FreeTextEntry1] : 1. Urethral prolapse\par - No further bleeding noted\par - Discussed continuation of topical vaginal estrogen to urethral area twice per week at night to prevent further issues or bleeding\par \par 2. Recurrent UTI/vaginal atrophy\par - Old Estring removed and new one replaced\par - No urinary complaints today \par \par RTO in 3 months, or sooner, as needed

## 2022-11-28 NOTE — HISTORY OF PRESENT ILLNESS
[FreeTextEntry1] : Genie is a 89yo with dementia, vulvovaginal atrophy, urethral prolapse and recurrent UTI who presents today for follow up. She is managed with Estring, which was last exchanged 9/13/22. She was last seen 10/24/22 for vaginal bleeding. Since last visit, her daughter and caregiver report no further episodes of bleeding. They have been applying topical vaginal estrogen nightly to urethral prolapse area. \par \par Patient's caregiver and daughter request Estring exchange today, as she is scheduled to come back in 2 weeks for this and they would like to avoid repeat exams for patient.

## 2022-11-28 NOTE — PHYSICAL EXAM
[Chaperone Present] : A chaperone was present in the examining room during all aspects of the physical examination [No Acute Distress] : in no acute distress [Well developed] : well developed [Well Nourished] : ~L well nourished [Warm and Dry] : was warm and dry to touch [Labia Majora] : were normal [Labia Minora] : were normal [Mucosal Prolapse] : had a mucosal prolapse [Normal] : was normal [Normal Appearance] : general appearance was normal [Atrophy] : atrophy [Tenderness] : ~T no ~M abdominal tenderness observed [Distended] : not distended [FreeTextEntry3] : no evidence of bleeding

## 2022-11-29 ENCOUNTER — NON-APPOINTMENT (OUTPATIENT)
Age: 87
End: 2022-11-29

## 2022-11-29 ENCOUNTER — INPATIENT (INPATIENT)
Facility: HOSPITAL | Age: 87
LOS: 2 days | Discharge: NURSING, REHAB OR LONG-TERM CARE | DRG: 562 | End: 2022-12-02
Attending: STUDENT IN AN ORGANIZED HEALTH CARE EDUCATION/TRAINING PROGRAM | Admitting: HOSPITALIST
Payer: MEDICARE

## 2022-11-29 VITALS
TEMPERATURE: 98 F | OXYGEN SATURATION: 98 % | SYSTOLIC BLOOD PRESSURE: 182 MMHG | WEIGHT: 114.64 LBS | HEIGHT: 61 IN | RESPIRATION RATE: 18 BRPM | HEART RATE: 94 BPM | DIASTOLIC BLOOD PRESSURE: 88 MMHG

## 2022-11-29 DIAGNOSIS — R03.0 ELEVATED BLOOD-PRESSURE READING, WITHOUT DIAGNOSIS OF HYPERTENSION: ICD-10-CM

## 2022-11-29 DIAGNOSIS — Z29.9 ENCOUNTER FOR PROPHYLACTIC MEASURES, UNSPECIFIED: ICD-10-CM

## 2022-11-29 DIAGNOSIS — F32.A DEPRESSION, UNSPECIFIED: ICD-10-CM

## 2022-11-29 DIAGNOSIS — S42.292A OTHER DISPLACED FRACTURE OF UPPER END OF LEFT HUMERUS, INITIAL ENCOUNTER FOR CLOSED FRACTURE: ICD-10-CM

## 2022-11-29 DIAGNOSIS — Z71.89 OTHER SPECIFIED COUNSELING: ICD-10-CM

## 2022-11-29 DIAGNOSIS — H40.1131 PRIMARY OPEN-ANGLE GLAUCOMA, BILATERAL, MILD STAGE: ICD-10-CM
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
## 2022-11-29 DIAGNOSIS — E03.9 HYPOTHYROIDISM, UNSPECIFIED: ICD-10-CM

## 2022-11-29 DIAGNOSIS — H35.3210 EXUDATIVE AGE-RELATED MACULAR DEGENERATION, RIGHT EYE, STAGE UNSPECIFIED: ICD-10-CM

## 2022-11-29 DIAGNOSIS — F03.C0 UNSPECIFIED DEMENTIA, SEVERE, WITHOUT BEHAVIORAL DISTURBANCE, PSYCHOTIC DISTURBANCE, MOOD DISTURBANCE, AND ANXIETY: ICD-10-CM

## 2022-11-29 DIAGNOSIS — Z90.710 ACQUIRED ABSENCE OF BOTH CERVIX AND UTERUS: Chronic | ICD-10-CM

## 2022-11-29 DIAGNOSIS — U07.1 COVID-19: ICD-10-CM

## 2022-11-29 DIAGNOSIS — R26.81 UNSTEADINESS ON FEET: ICD-10-CM

## 2022-11-29 DIAGNOSIS — S42.309A UNSPECIFIED FRACTURE OF SHAFT OF HUMERUS, UNSPECIFIED ARM, INITIAL ENCOUNTER FOR CLOSED FRACTURE: ICD-10-CM

## 2022-11-29 LAB
ALBUMIN SERPL ELPH-MCNC: 3.7 G/DL — SIGNIFICANT CHANGE UP (ref 3.3–5)
ALP SERPL-CCNC: 64 U/L — SIGNIFICANT CHANGE UP (ref 40–120)
ALT FLD-CCNC: 8 U/L — LOW (ref 10–45)
ANION GAP SERPL CALC-SCNC: 9 MMOL/L — SIGNIFICANT CHANGE UP (ref 5–17)
APTT BLD: 32.2 SEC — SIGNIFICANT CHANGE UP (ref 27.5–35.5)
AST SERPL-CCNC: 23 U/L — SIGNIFICANT CHANGE UP (ref 10–40)
BASOPHILS # BLD AUTO: 0.02 K/UL — SIGNIFICANT CHANGE UP (ref 0–0.2)
BASOPHILS NFR BLD AUTO: 0.3 % — SIGNIFICANT CHANGE UP (ref 0–2)
BILIRUB SERPL-MCNC: 0.5 MG/DL — SIGNIFICANT CHANGE UP (ref 0.2–1.2)
BUN SERPL-MCNC: 16 MG/DL — SIGNIFICANT CHANGE UP (ref 7–23)
CALCIUM SERPL-MCNC: 8.6 MG/DL — SIGNIFICANT CHANGE UP (ref 8.4–10.5)
CHLORIDE SERPL-SCNC: 98 MMOL/L — SIGNIFICANT CHANGE UP (ref 96–108)
CO2 SERPL-SCNC: 24 MMOL/L — SIGNIFICANT CHANGE UP (ref 22–31)
CREAT SERPL-MCNC: 0.69 MG/DL — SIGNIFICANT CHANGE UP (ref 0.5–1.3)
EGFR: 78 ML/MIN/1.73M2 — SIGNIFICANT CHANGE UP
EOSINOPHIL # BLD AUTO: 0.02 K/UL — SIGNIFICANT CHANGE UP (ref 0–0.5)
EOSINOPHIL NFR BLD AUTO: 0.3 % — SIGNIFICANT CHANGE UP (ref 0–6)
FLUAV AG NPH QL: SIGNIFICANT CHANGE UP
FLUBV AG NPH QL: SIGNIFICANT CHANGE UP
GLUCOSE SERPL-MCNC: 108 MG/DL — HIGH (ref 70–99)
HCT VFR BLD CALC: 36.8 % — SIGNIFICANT CHANGE UP (ref 34.5–45)
HGB BLD-MCNC: 11.7 G/DL — SIGNIFICANT CHANGE UP (ref 11.5–15.5)
IMM GRANULOCYTES NFR BLD AUTO: 0.7 % — SIGNIFICANT CHANGE UP (ref 0–0.9)
INR BLD: 1.15 RATIO — SIGNIFICANT CHANGE UP (ref 0.88–1.16)
LACTATE BLDV-MCNC: 1.5 MMOL/L — SIGNIFICANT CHANGE UP (ref 0.5–2)
LIDOCAIN IGE QN: 43 U/L — SIGNIFICANT CHANGE UP (ref 7–60)
LYMPHOCYTES # BLD AUTO: 0.88 K/UL — LOW (ref 1–3.3)
LYMPHOCYTES # BLD AUTO: 12.8 % — LOW (ref 13–44)
MCHC RBC-ENTMCNC: 30.1 PG — SIGNIFICANT CHANGE UP (ref 27–34)
MCHC RBC-ENTMCNC: 31.8 GM/DL — LOW (ref 32–36)
MCV RBC AUTO: 94.6 FL — SIGNIFICANT CHANGE UP (ref 80–100)
MONOCYTES # BLD AUTO: 0.64 K/UL — SIGNIFICANT CHANGE UP (ref 0–0.9)
MONOCYTES NFR BLD AUTO: 9.3 % — SIGNIFICANT CHANGE UP (ref 2–14)
NEUTROPHILS # BLD AUTO: 5.24 K/UL — SIGNIFICANT CHANGE UP (ref 1.8–7.4)
NEUTROPHILS NFR BLD AUTO: 76.6 % — SIGNIFICANT CHANGE UP (ref 43–77)
NRBC # BLD: 0 /100 WBCS — SIGNIFICANT CHANGE UP (ref 0–0)
PLATELET # BLD AUTO: 133 K/UL — LOW (ref 150–400)
POTASSIUM SERPL-MCNC: 4.1 MMOL/L — SIGNIFICANT CHANGE UP (ref 3.5–5.3)
POTASSIUM SERPL-SCNC: 4.1 MMOL/L — SIGNIFICANT CHANGE UP (ref 3.5–5.3)
PROT SERPL-MCNC: 6.8 G/DL — SIGNIFICANT CHANGE UP (ref 6–8.3)
PROTHROM AB SERPL-ACNC: 13.4 SEC — SIGNIFICANT CHANGE UP (ref 10.5–13.4)
RBC # BLD: 3.89 M/UL — SIGNIFICANT CHANGE UP (ref 3.8–5.2)
RBC # FLD: 13.1 % — SIGNIFICANT CHANGE UP (ref 10.3–14.5)
RSV RNA NPH QL NAA+NON-PROBE: SIGNIFICANT CHANGE UP
SARS-COV-2 RNA SPEC QL NAA+PROBE: DETECTED
SODIUM SERPL-SCNC: 131 MMOL/L — LOW (ref 135–145)
WBC # BLD: 6.85 K/UL — SIGNIFICANT CHANGE UP (ref 3.8–10.5)
WBC # FLD AUTO: 6.85 K/UL — SIGNIFICANT CHANGE UP (ref 3.8–10.5)

## 2022-11-29 PROCEDURE — 76377 3D RENDER W/INTRP POSTPROCES: CPT | Mod: 26,77

## 2022-11-29 PROCEDURE — 99497 ADVNCD CARE PLAN 30 MIN: CPT | Mod: 25

## 2022-11-29 PROCEDURE — 70450 CT HEAD/BRAIN W/O DYE: CPT | Mod: 26,MA

## 2022-11-29 PROCEDURE — 71260 CT THORAX DX C+: CPT | Mod: 26,MA

## 2022-11-29 PROCEDURE — 72125 CT NECK SPINE W/O DYE: CPT | Mod: 26,MA

## 2022-11-29 PROCEDURE — 73200 CT UPPER EXTREMITY W/O DYE: CPT | Mod: 26,RT,MD

## 2022-11-29 PROCEDURE — 74177 CT ABD & PELVIS W/CONTRAST: CPT | Mod: 26,MA

## 2022-11-29 PROCEDURE — 73030 X-RAY EXAM OF SHOULDER: CPT | Mod: 26,RT

## 2022-11-29 PROCEDURE — 76377 3D RENDER W/INTRP POSTPROCES: CPT | Mod: 26

## 2022-11-29 PROCEDURE — 72170 X-RAY EXAM OF PELVIS: CPT | Mod: 26

## 2022-11-29 PROCEDURE — 73060 X-RAY EXAM OF HUMERUS: CPT | Mod: 26,RT

## 2022-11-29 PROCEDURE — 99285 EMERGENCY DEPT VISIT HI MDM: CPT | Mod: FS,CS

## 2022-11-29 PROCEDURE — 73200 CT UPPER EXTREMITY W/O DYE: CPT | Mod: 26,RT,77

## 2022-11-29 PROCEDURE — 99223 1ST HOSP IP/OBS HIGH 75: CPT

## 2022-11-29 RX ORDER — LATANOPROST 0.05 MG/ML
1 SOLUTION/ DROPS OPHTHALMIC; TOPICAL AT BEDTIME
Refills: 0 | Status: DISCONTINUED | OUTPATIENT
Start: 2022-11-29 | End: 2022-12-02

## 2022-11-29 RX ORDER — LEVOTHYROXINE SODIUM 125 MCG
1 TABLET ORAL
Qty: 0 | Refills: 0 | DISCHARGE

## 2022-11-29 RX ORDER — ACETAMINOPHEN 500 MG
1000 TABLET ORAL ONCE
Refills: 0 | Status: COMPLETED | OUTPATIENT
Start: 2022-11-29 | End: 2022-11-29

## 2022-11-29 RX ORDER — LANOLIN ALCOHOL/MO/W.PET/CERES
3 CREAM (GRAM) TOPICAL AT BEDTIME
Refills: 0 | Status: DISCONTINUED | OUTPATIENT
Start: 2022-11-29 | End: 2022-12-02

## 2022-11-29 RX ORDER — CHLORHEXIDINE GLUCONATE 213 G/1000ML
1 SOLUTION TOPICAL DAILY
Refills: 0 | Status: DISCONTINUED | OUTPATIENT
Start: 2022-11-29 | End: 2022-11-30

## 2022-11-29 RX ORDER — LEVOTHYROXINE SODIUM 125 MCG
75 TABLET ORAL DAILY
Refills: 0 | Status: DISCONTINUED | OUTPATIENT
Start: 2022-11-29 | End: 2022-12-02

## 2022-11-29 RX ORDER — SERTRALINE 25 MG/1
1 TABLET, FILM COATED ORAL
Qty: 0 | Refills: 0 | DISCHARGE

## 2022-11-29 RX ORDER — SODIUM CHLORIDE 9 MG/ML
250 INJECTION INTRAMUSCULAR; INTRAVENOUS; SUBCUTANEOUS ONCE
Refills: 0 | Status: COMPLETED | OUTPATIENT
Start: 2022-11-29 | End: 2022-11-29

## 2022-11-29 RX ORDER — LATANOPROST 0.05 MG/ML
1 SOLUTION/ DROPS OPHTHALMIC; TOPICAL
Qty: 0 | Refills: 0 | DISCHARGE

## 2022-11-29 RX ORDER — SERTRALINE 25 MG/1
50 TABLET, FILM COATED ORAL DAILY
Refills: 0 | Status: DISCONTINUED | OUTPATIENT
Start: 2022-11-29 | End: 2022-12-02

## 2022-11-29 RX ORDER — SODIUM CHLORIDE 9 MG/ML
500 INJECTION, SOLUTION INTRAVENOUS
Refills: 0 | Status: DISCONTINUED | OUTPATIENT
Start: 2022-11-29 | End: 2022-12-02

## 2022-11-29 RX ORDER — ACETAMINOPHEN 500 MG
650 TABLET ORAL EVERY 6 HOURS
Refills: 0 | Status: DISCONTINUED | OUTPATIENT
Start: 2022-11-29 | End: 2022-12-02

## 2022-11-29 RX ORDER — ACETAMINOPHEN 500 MG
100 TABLET ORAL ONCE
Refills: 0 | Status: DISCONTINUED | OUTPATIENT
Start: 2022-11-29 | End: 2022-11-29

## 2022-11-29 RX ADMIN — SODIUM CHLORIDE 75 MILLILITER(S): 9 INJECTION, SOLUTION INTRAVENOUS at 18:10

## 2022-11-29 RX ADMIN — Medication 1000 MILLIGRAM(S): at 14:35

## 2022-11-29 RX ADMIN — LATANOPROST 1 DROP(S): 0.05 SOLUTION/ DROPS OPHTHALMIC; TOPICAL at 23:38

## 2022-11-29 RX ADMIN — SERTRALINE 50 MILLIGRAM(S): 25 TABLET, FILM COATED ORAL at 23:38

## 2022-11-29 RX ADMIN — Medication 400 MILLIGRAM(S): at 13:15

## 2022-11-29 RX ADMIN — Medication 1000 MILLIGRAM(S): at 16:35

## 2022-11-29 RX ADMIN — SODIUM CHLORIDE 250 MILLILITER(S): 9 INJECTION INTRAMUSCULAR; INTRAVENOUS; SUBCUTANEOUS at 13:35

## 2022-11-29 RX ADMIN — SODIUM CHLORIDE 250 MILLILITER(S): 9 INJECTION INTRAMUSCULAR; INTRAVENOUS; SUBCUTANEOUS at 13:16

## 2022-11-29 RX ADMIN — CHLORHEXIDINE GLUCONATE 1 APPLICATION(S): 213 SOLUTION TOPICAL at 18:13

## 2022-11-29 NOTE — ED ADULT NURSE REASSESSMENT NOTE - NS ED NURSE REASSESS COMMENT FT1
Report received on patient, alert and responsive hx; dementia, sleeping in bed, arousable, no pain when resting, when right arm moves, touched, complaints of pain, sling in place, bruising noted to right upper arm. daughter at bedside.

## 2022-11-29 NOTE — ED PROVIDER NOTE - PROGRESS NOTE DETAILS
C collar changed to [facility] to facilitate CT scan, pt neuro vasc intact before and after the switch.  Ruperto

## 2022-11-29 NOTE — PATIENT PROFILE ADULT - FALL HARM RISK - HARM RISK INTERVENTIONS

## 2022-11-29 NOTE — H&P ADULT - PROBLEM SELECTOR PLAN 11
GOC discussion had with Cheryl, as above, who discussed with other HCPs  KATRIN filled out, DNR/DNI signed  35 minutes spent on GOC

## 2022-11-29 NOTE — H&P ADULT - PROBLEM SELECTOR PLAN 1
R humerus fracture s/p mechanical fall from bed  - Appreciate ortho recs. Initially rec'd no surgical intervention but discussions ongoing at family request given that this arm is necessary for patient to ambulate given she uses a walker  - No emergent surgery  - Tylenol for pain control  - PT/OT R humerus fracture s/p mechanical fall from bed  - Appreciate ortho recs. Initially rec'd no surgical intervention but discussions ongoing at family request given that this arm is necessary for patient to ambulate given she uses a walker.   - Follow up final ortho recs.  - No emergent surgery  - Tylenol for pain control  - PT/OT

## 2022-11-29 NOTE — H&P ADULT - CONVERSATION DETAILS
Discussed that the patient has multiple comorbidities and frailty, though for now we can continue to treat the immediate condition as per the family's wishes    Discussed MOLST and DNR/DNI status. Cheryl signed DNR/DNI, discussed with her  and brother who are in agreement.  Will continue medical treatments as needed. She is also not ready to make a decision about any tube feeds at this time.

## 2022-11-29 NOTE — ED ADULT NURSE NOTE - NSIMPLEMENTINTERV_GEN_ALL_ED
Implemented All Fall with Harm Risk Interventions:  [place] to call system. Call bell, personal items and telephone within reach. Instruct patient to call for assistance. Room bathroom lighting operational. Non-slip footwear when patient is off stretcher. Physically safe environment: no spills, clutter or unnecessary equipment. Stretcher in lowest position, wheels locked, appropriate side rails in place. Provide visual cue, wrist band, yellow gown, etc. Monitor gait and stability. Monitor for mental status changes and reorient to person, place, and time. Review medications for side effects contributing to fall risk. Reinforce activity limits and safety measures with patient and family. Provide visual clues: red socks.

## 2022-11-29 NOTE — H&P ADULT - HISTORY OF PRESENT ILLNESS
89 year old woman with hx of dementia (A&Ox1 at baseline), hypothyroid, glaucoma, macular degeneration, UTIs, and hyponatremia presented to ED after a mechanical fall while getting out of bed this AM. Patient lives at home with 24 hour aid. Patient was attempting to get out of bed, fell onto her right side. EMS called and brought her to ED. Patient normally ambulates with a walker, but has been unsteady and has fallen before (last 1 year ago). Patient had her vaginal estrogen ring replaced yesterday, but no other recent med changes.    History obtained from daughter, Cheryl, at bedside. Reports that the patient has not been complaining of any particular symptoms lately. Denies fevers, chills, CP, SOB, dysuria, bleeding, diarrhea, decreased PO intake.    In ED, T 97.5 HR 94, BPmax 206/81, later improved to 149/81 without intervention, initially on O2 but decreased to RA and SpO2 94% RR 16.    CT revealed R proximal humerus fracture and COVID PCR positive. Ortho consulted, initially recs no surgery.

## 2022-11-29 NOTE — ED CLERICAL - NSCLERICAL TASK_GEN_ALL_ED
Patient: Gail Perry    Procedure(s):  UPPER ENDOSCOPY WITH BIOPSY    Diagnosis:ESOPHAGEAL DYSPHAGIA  Diagnosis Additional Information: No value filed.    Anesthesia Type:  MAC    Note:  Anesthesia Post Evaluation    Patient location during evaluation: Phase 2 and Bedside  Patient participation: Able to fully participate in evaluation  Level of consciousness: awake and alert  Pain management: adequate  Airway patency: patent  Cardiovascular status: acceptable  Respiratory status: acceptable  Hydration status: stable  PONV: none     Anesthetic complications: None          Last vitals:  Vitals:    08/29/19 1045 08/29/19 1050 08/29/19 1055   BP: 122/75 137/79 129/71   Pulse:      Resp: 20 20 20   Temp:   97.1  F (36.2  C)   SpO2: 97% 95% 94%         Electronically Signed By: Ángel Martinez MD  August 30, 2019  7:17 AM   Pre-Hospital Care Report (PCR)

## 2022-11-29 NOTE — H&P ADULT - PROBLEM SELECTOR PLAN 7
SBP initially 200s. Likely i/s/o pain. No history of HTN  - Pain control as needed, avoid hypotension due to fall history

## 2022-11-29 NOTE — H&P ADULT - NSHPLABSRESULTS_GEN_ALL_CORE
11.7   6.85  )-----------( 133      ( 29 Nov 2022 13:01 )             36.8     11-29    131<L>  |  98  |  16  ----------------------------<  108<H>  4.1   |  24  |  0.69    Ca    8.6      29 Nov 2022 13:01    TPro  6.8  /  Alb  3.7  /  TBili  0.5  /  DBili  x   /  AST  23  /  ALT  8<L>  /  AlkPhos  64  11-29    < from: CT Shoulder No Cont, Right (11.29.22 @ 16:01) >    IMPRESSION:    Acute comminuted displaced proximal right humeral diaphyseal fracture as   above.    Shoulder images were reformatted from the chest abdomen pelvis study. The   acromioclavicular joint and most cranial aspect of the humeral head are   excluded from the field-of-view. This was discussed with covering   orthopedics resident.    < end of copied text >    < from: Xray Shoulder 2 Views, Right (11.29.22 @ 14:07) >    IMPRESSION:  Acute displaced comminuted slightly angulated proximal right humeral   surgical neck region fracture with associated overlying soft tissue   swelling. No associated glenohumeral joint dislocation and no additional   fractures.    Intact aligned AC and elbow joints.    Generalized osteopenia otherwise no discrete suspicious lytic or blastic   lesions particularly around the fracture margins to suspect a pathologic   etiology.    Also correlate with findings on concurrently performed shoulder CT.    < end of copied text >    < from: Xray Pelvis AP only (11.29.22 @ 14:06) >    IMPRESSION:  Stable intact aligned partially visualized upper end of a previously seen   left total hip prosthesis.    No dislocations or acute appearing fractures.    Intact pelvic and obturator rings and symmetrically aligned spaced SI   joints and pubic symphysis. Advanced lower lumbar spine degenerative   change again noted. Preserved right hip joint space and no gross   radiographic evidence for AVN.    Generalized osteopenia otherwise no discrete lytic or blastic lesions.    Bladder distended with residual contrast tilt.    --- End of Report ---

## 2022-11-29 NOTE — PATIENT PROFILE ADULT - FUNCTIONAL ASSESSMENT - DAILY ACTIVITY ASSESSMENT TYPE
sounds. Pulmonary:      Effort: Pulmonary effort is normal.      Breath sounds: Wheezing and rhonchi present. Abdominal:      General: Bowel sounds are normal.      Palpations: Abdomen is soft. Tenderness: There is no abdominal tenderness. Musculoskeletal: Normal range of motion. Skin:     General: Skin is warm and dry. Findings: No rash. Neurological:      Mental Status: He is alert and oriented to person, place, and time. Deep Tendon Reflexes: Reflexes are normal and symmetric. Psychiatric:         Behavior: Behavior normal.              Assessment/Plan:  1. Centrilobular emphysema ([address]  Chest x-ray ordered  Patient just finished levaquin  Will start prednisone 40 mg daily for 7 days  Will wait results of chest x-ray prior to starting another round of antibiotics. - XR CHEST STANDARD (2 VW); Future  - predniSONE (DELTASONE) 20 MG tablet; Take 2 tablets by mouth daily for 7 days  Dispense: 14 tablet; Refill: 0    2. Acute URI  Will restart prednisone. - predniSONE (DELTASONE) 20 MG tablet; Take 2 tablets by mouth daily for 7 days  Dispense: 14 tablet; Refill: 0           Zachariah Raman DO  3/23/20     This visit was provided as a focused evaluation during the COVID -19 pandemic/national emergency. A comprehensive review of all previous patient history and testing was not conducted. Pertinent findings were elicited during the visit.
Admission

## 2022-11-29 NOTE — H&P ADULT - PROBLEM SELECTOR PLAN 8
Mechanical fall, witnessed.   - Fracture management as above  - PT/OT  - Fall precautions  - VitD level

## 2022-11-29 NOTE — ED PROVIDER NOTE - CLINICAL SUMMARY MEDICAL DECISION MAKING FREE TEXT BOX
Attending MD Gonzales:  89 y.o. female history of dementia, hypothyroid, glaucoma, and hyponatremia s/p  fall out of bed this morning.  Pt was witnessed by the aide as she fell to her right side and hit head.  No LOC, was at her baseline before during and after the fall.  Pt currently with pain and deformity to the right shoulder worse with movement.   A & O x 1 (baseline), Head NCAT and no facial asymmetry; C-spine with no midline tenderness to palpation and full range of motion; lungs CTAB with no chest wall trauma or TTP, heart with reg rhythm without murmur; abdomen soft NTND with no R/G; extremities swelling and deformity to right shoulder/ humerus; skin with no rashes, neuro exam non focal with no motor or sensory deficits and patient is moving all extremities spontaneously. Plan: preoperative labs with type and screen, IVF, pain control, xrays, orthopedic consult, admit.

## 2022-11-29 NOTE — H&P ADULT - PROBLEM SELECTOR PLAN 2
Asymptomatic, no findings on CXR or CT chest to suggest indication for treatment  - Airborne Isolation  - Continue to monitor for hypoxia or symptoms  - Monitor off RDV or dex  - Holding AC due to fall/hematoma formation

## 2022-11-29 NOTE — ED PROVIDER NOTE - CARE PLAN
Principal Discharge DX:	Fracture of head of left humerus  Secondary Diagnosis:	2019 novel coronavirus disease (COVID-19)   1

## 2022-11-29 NOTE — H&P ADULT - ASSESSMENT
89 year old woman with hx of dementia (A&Ox1 at baseline), hypothyroid, glaucoma, macular degeneration, UTIs, and hyponatremia presented to ED after a mechanical fall with R humerus fx and asymptomatic COVID-19 infection

## 2022-11-29 NOTE — H&P ADULT - PROBLEM SELECTOR PLAN 3
Currently below baseline mental status, possibly hospital associated delirium with fall. CTH without bleed  - F/u UA  - Redirection as needed  - Pain control  - Avoid sedating medications

## 2022-11-29 NOTE — ED PROVIDER NOTE - MUSCULOSKELETAL, MLM
No midline spinal tenderness, NEXUS negative.  Chest and pelvis non tender and stable.  Left UE with full ROM and non tender throughout. Right shoulder with deormity and tenderness pt reluctant to move RUE.  Right elbow NT full ROM, right forearm and wrist and hand NT full ROM.  b/l LE's full ROM NT throughout.

## 2022-11-29 NOTE — H&P ADULT - NSHPPHYSICALEXAM_GEN_ALL_CORE
Vital Signs Last 24 Hrs  T(C): 36.4 (29 Nov 2022 17:22), Max: 36.4 (29 Nov 2022 12:06)  T(F): 97.5 (29 Nov 2022 17:22), Max: 97.5 (29 Nov 2022 12:06)  HR: 70 (29 Nov 2022 17:22) (66 - 94)  BP: 149/81 (29 Nov 2022 17:22) (149/81 - 206/81)  BP(mean): 109 (29 Nov 2022 16:19) (109 - 109)  RR: 16 (29 Nov 2022 17:22) (16 - 18)  SpO2: 94% (29 Nov 2022 17:22) (94% - 98%)    Parameters below as of 29 Nov 2022 17:22  Patient On (Oxygen Delivery Method): room air        CONSTITUTIONAL: Elderly woman sleeping, easily arousable but minimal answers to questions  EYES: No conjunctival or scleral injection, non-icteric; PERRLA and symmetric  ENMT: No external nasal lesions; poor dentition; no pharyngeal injection or exudates, dry oral mucosa  NECK: Trachea midline without palpable neck mass; thyroid not enlarged and non-tender  RESPIRATORY: Breathing comfortably; lungs CTA without wheeze/rhonchi/rales  CARDIOVASCULAR: +S1S2, RRR, no M/G/R; no carotid bruits; pedal pulses full and symmetric; no lower extremity edema  GASTROINTESTINAL: No palpable masses or tenderness, +BS throughout, no rebound/guarding; no hepatosplenomegaly; no hernia palpated  LYMPHATIC: No cervical LAD or tenderness;   MUSCULOSKELETAL: RUE in sling, hematoma over R humerus, nontender, not tense.   VASCULAR: BL upper extremity radial pulses 2+, skin WWP  SKIN: No rashes or ulcers noted; no subcutaneous nodules or induration palpable. + skin tenting  NEUROLOGIC: CN II-XII intact; sensation intact in LEs b/l to light touch  PSYCHIATRIC: A+O x 0; lethargic

## 2022-11-29 NOTE — ED ADULT NURSE NOTE - OBJECTIVE STATEMENT
89 year old female pt presented to the  ED via ems s/p fall pt lives at home with a home health aid stating pt was getting out of bed this am and fell to the ground landing on right side, fall was unwitnessed, right shoulder deformity noted, abrasion to right cheek pt does not recall event, as per daughter pt ambulates with walker with assistance no hematoma to head, as per daughter bump on head is a known growth, no other deformity noted and soft non tender non distended lung field cta abd soft non distended non tender

## 2022-11-29 NOTE — ED PROVIDER NOTE - ATTENDING APP SHARED VISIT CONTRIBUTION OF CARE
Attending MD Gonzales:   I personally have seen and examined this patient.  Physician assistant note reviewed and agree on plan of care and except where noted.  Please see my MDM for further details.

## 2022-11-29 NOTE — CONSULT NOTE ADULT - SUBJECTIVE AND OBJECTIVE BOX
89yFemale hx dementia who ambulates with walker c/o R shoulder pain s/p MF from bed. Patient denies LOC. Patient denies numbness or tingling in the RUE. Patient denies any other injuries.    ROS: 10 point ROS otherwise negative    PMH:  Dementia    Hypothyroid    Hyponatremia    Glaucoma    Anxiety      PSH:  No significant past surgical history    S/P hysterectomy      AH:  G[facility] (Rash)    Meds: See med rec    T(C): 36.4 (11-29-22 @ 12:06)  HR: 66 (11-29-22 @ 12:38)  BP: 206/81 (11-29-22 @ 12:38)  RR: 16 (11-29-22 @ 12:38)  SpO2: 98% (11-29-22 @ 12:38)  Wt(kg): --    Gen: NAD  Resp: Unlabored breathing  PE RUE:  Skin intact, ecchymosis over deltoid,    SILT axillary/med/rad/ulnar  +Motor AIN/PIN/Ulnar/Radial/Musc/Median,   +painless elbow/wrist ROM,   shoulder ROM limited 2/2 pain,   2+radial pulse, soft compartments.    Secondary:  No TTP over bony landmarks, SILT BL, ROM intact BL, distal pulses palpable.    Imaging:  XR demonstrating right proximal humerus fracture    89yFemale with R proximal humerus fracture    pain control  NWB in sling  PT/OT  No acute orthopaedic intervention  Ortho to sign off  Please call if you have further question  Can follow up with Dr. Samuels in 1-2 weeks or upon discharge    Ortho [id number]

## 2022-11-29 NOTE — ED PROVIDER NOTE - CHIEF COMPLAINT
The patient is a 89y Female complaining of 
Spine appears normal, range of motion is not limited, no muscle or joint tenderness

## 2022-11-29 NOTE — ED PROVIDER NOTE - OBJECTIVE STATEMENT
89 y.o. female history of dementia coming in by EMS after a fall out of bed this morning.  Pt was witnessed by the aide as she fell to her right side and hit head.  No LOC, was at her baseline before during and after the fall.  Pt currently with pain and deformity to the right shoulder worse with movement.  Otherwise oriented x 1 and unable to give an adequate history. 89 y.o. female history of dementia, hypothyroid, glaucoma, and hyponatremia coming in by EMS after a fall out of bed this morning.  Pt was witnessed by the aide as she fell to her right side and hit head.  No LOC, was at her baseline before during and after the fall.  Pt currently with pain and deformity to the right shoulder worse with movement.  Otherwise oriented x 1 and unable to give an adequate history.

## 2022-11-30 ENCOUNTER — TRANSCRIPTION ENCOUNTER (OUTPATIENT)
Age: 87
End: 2022-11-30

## 2022-11-30 LAB
A1C WITH ESTIMATED AVERAGE GLUCOSE RESULT: 5.7 % — HIGH (ref 4–5.6)
ANION GAP SERPL CALC-SCNC: 9 MMOL/L — SIGNIFICANT CHANGE UP (ref 5–17)
APPEARANCE UR: ABNORMAL
APTT BLD: 30.7 SEC — SIGNIFICANT CHANGE UP (ref 27.5–35.5)
BILIRUB UR-MCNC: NEGATIVE — SIGNIFICANT CHANGE UP
BLD GP AB SCN SERPL QL: NEGATIVE — SIGNIFICANT CHANGE UP
BUN SERPL-MCNC: 14 MG/DL — SIGNIFICANT CHANGE UP (ref 7–23)
CALCIUM SERPL-MCNC: 8.4 MG/DL — SIGNIFICANT CHANGE UP (ref 8.4–10.5)
CHLORIDE SERPL-SCNC: 99 MMOL/L — SIGNIFICANT CHANGE UP (ref 96–108)
CO2 SERPL-SCNC: 24 MMOL/L — SIGNIFICANT CHANGE UP (ref 22–31)
COLOR SPEC: YELLOW — SIGNIFICANT CHANGE UP
CREAT SERPL-MCNC: 0.61 MG/DL — SIGNIFICANT CHANGE UP (ref 0.5–1.3)
DIFF PNL FLD: NEGATIVE — SIGNIFICANT CHANGE UP
EGFR: 81 ML/MIN/1.73M2 — SIGNIFICANT CHANGE UP
ESTIMATED AVERAGE GLUCOSE: 117 MG/DL — HIGH (ref 68–114)
GLUCOSE SERPL-MCNC: 83 MG/DL — SIGNIFICANT CHANGE UP (ref 70–99)
GLUCOSE UR QL: ABNORMAL
HCT VFR BLD CALC: 30.9 % — LOW (ref 34.5–45)
HGB BLD-MCNC: 9.9 G/DL — LOW (ref 11.5–15.5)
INR BLD: 1.13 RATIO — SIGNIFICANT CHANGE UP (ref 0.88–1.16)
KETONES UR-MCNC: ABNORMAL
LEUKOCYTE ESTERASE UR-ACNC: ABNORMAL
MCHC RBC-ENTMCNC: 29.9 PG — SIGNIFICANT CHANGE UP (ref 27–34)
MCHC RBC-ENTMCNC: 32 GM/DL — SIGNIFICANT CHANGE UP (ref 32–36)
MCV RBC AUTO: 93.4 FL — SIGNIFICANT CHANGE UP (ref 80–100)
MRSA PCR RESULT.: DETECTED
NITRITE UR-MCNC: POSITIVE
NRBC # BLD: 0 /100 WBCS — SIGNIFICANT CHANGE UP (ref 0–0)
PH UR: 6 — SIGNIFICANT CHANGE UP (ref 5–8)
PLATELET # BLD AUTO: 116 K/UL — LOW (ref 150–400)
POTASSIUM SERPL-MCNC: 3.9 MMOL/L — SIGNIFICANT CHANGE UP (ref 3.5–5.3)
POTASSIUM SERPL-SCNC: 3.9 MMOL/L — SIGNIFICANT CHANGE UP (ref 3.5–5.3)
PROT UR-MCNC: ABNORMAL
PROTHROM AB SERPL-ACNC: 13 SEC — SIGNIFICANT CHANGE UP (ref 10.5–13.4)
RBC # BLD: 3.31 M/UL — LOW (ref 3.8–5.2)
RBC # FLD: 13.2 % — SIGNIFICANT CHANGE UP (ref 10.3–14.5)
RH IG SCN BLD-IMP: POSITIVE — SIGNIFICANT CHANGE UP
S AUREUS DNA NOSE QL NAA+PROBE: DETECTED
SODIUM SERPL-SCNC: 132 MMOL/L — LOW (ref 135–145)
SP GR SPEC: 1.03 — HIGH (ref 1.01–1.02)
TSH SERPL-MCNC: 3.87 UIU/ML — SIGNIFICANT CHANGE UP (ref 0.27–4.2)
UROBILINOGEN FLD QL: NEGATIVE — SIGNIFICANT CHANGE UP
VIT D25+D1,25 OH+D1,25 PNL SERPL-MCNC: 45.3 PG/ML — SIGNIFICANT CHANGE UP (ref 19.9–79.3)
WBC # BLD: 6.48 K/UL — SIGNIFICANT CHANGE UP (ref 3.8–10.5)
WBC # FLD AUTO: 6.48 K/UL — SIGNIFICANT CHANGE UP (ref 3.8–10.5)

## 2022-11-30 PROCEDURE — 99232 SBSQ HOSP IP/OBS MODERATE 35: CPT

## 2022-11-30 PROCEDURE — 99231 SBSQ HOSP IP/OBS SF/LOW 25: CPT | Mod: GC

## 2022-11-30 RX ORDER — MUPIROCIN 20 MG/G
1 OINTMENT TOPICAL
Refills: 0 | Status: DISCONTINUED | OUTPATIENT
Start: 2022-11-30 | End: 2022-12-02

## 2022-11-30 RX ORDER — OXYCODONE HYDROCHLORIDE 5 MG/1
5 TABLET ORAL EVERY 6 HOURS
Refills: 0 | Status: DISCONTINUED | OUTPATIENT
Start: 2022-11-30 | End: 2022-12-02

## 2022-11-30 RX ORDER — KETOROLAC TROMETHAMINE 30 MG/ML
15 SYRINGE (ML) INJECTION EVERY 6 HOURS
Refills: 0 | Status: DISCONTINUED | OUTPATIENT
Start: 2022-11-30 | End: 2022-12-02

## 2022-11-30 RX ORDER — CEFTRIAXONE 500 MG/1
1000 INJECTION, POWDER, FOR SOLUTION INTRAMUSCULAR; INTRAVENOUS EVERY 24 HOURS
Refills: 0 | Status: DISCONTINUED | OUTPATIENT
Start: 2022-11-30 | End: 2022-12-02

## 2022-11-30 RX ADMIN — MUPIROCIN 1 APPLICATION(S): 20 OINTMENT TOPICAL at 23:10

## 2022-11-30 RX ADMIN — LATANOPROST 1 DROP(S): 0.05 SOLUTION/ DROPS OPHTHALMIC; TOPICAL at 23:08

## 2022-11-30 RX ADMIN — MUPIROCIN 1 APPLICATION(S): 20 OINTMENT TOPICAL at 10:15

## 2022-11-30 RX ADMIN — Medication 75 MICROGRAM(S): at 05:34

## 2022-11-30 RX ADMIN — SERTRALINE 50 MILLIGRAM(S): 25 TABLET, FILM COATED ORAL at 23:12

## 2022-11-30 NOTE — DISCHARGE NOTE PROVIDER - CARE PROVIDER_API CALL
Jose Samuels)  Orthopaedic Surgery  [address]  [place] [address]  Phone: [phone]  Fax: [phone]  Follow Up Time: 2 weeks   Bennett Cameron (MD)  Orthopaedic Surgery  [address]  [place] [address]  Phone: [phone]  Fax: [phone]  Follow Up Time: 2 weeks   Bennett Cameron)  Orthopaedic Surgery  [address]  [place] [address]  Phone: [phone]  Fax: [phone]  Follow Up Time: 2 weeks    Srinivas Florence)  Geriatric Medicine; [facility] Medicine  [address]  [place] [address]  Phone: [phone]  Fax: [phone]  [facility] Patient  Follow Up Time: 2 weeks

## 2022-11-30 NOTE — PHYSICAL THERAPY INITIAL EVALUATION ADULT - ACTIVE RANGE OF MOTION EXAMINATION, REHAB EVAL
RUE AROM 0%/Left UE Active ROM was WFL (within functional limits)/bilateral  lower extremity Active ROM was WFL (within functional limits)

## 2022-11-30 NOTE — PROGRESS NOTE ADULT - PROBLEM SELECTOR PLAN 8
Mechanical fall, witnessed.   - Fracture management as above  - PT/OT  - Fall precautions  - VitD level Mechanical fall, witnessed.   - Fracture management as above  - PT/OT  - Fall precautions  - VitD level WNL

## 2022-11-30 NOTE — DISCHARGE NOTE PROVIDER - ATTENDING DISCHARGE PHYSICAL EXAMINATION:
Briefly, patient is 89F PMH dementia, hypothyroidism, presented after a fall out of bed, with a humerus fracture. Incidentally found to be COVID positive however is asymptomatic. Orthopedic service consulted and no acute surgical intervention is indicated. Patient seen by PT who recommended JOSEP. Patient is now medically stable, ready for discharge at this time.    Constitutional: No acute distress  Head: NC/AT  Eyes: PERRL, EOMI, anicteric sclera  ENT: no nasal discharge; uvula midline, no oropharyngeal erythema or exudates; MMM  Neck: supple; no JVD or thyromegaly  Respiratory: CTA B/L; no W/R/R, no retractions  Cardiac: +S1/S2; RRR; no M/R/G; PMI non-displaced, no lower extremity edema   Gastrointestinal: abdomen soft, NT/ND; no rebound or guarding; +BSx4  Back: spine midline, no bony tenderness or step-offs; no CVAT B/L  Extremities: Arm in sling, WWP, pulses intact in radial artery bilaterally   Vascular: 2+ radial, femoral, DP/PT pulses B/L  Dermatologic: skin warm, dry and intact; no rashes, wounds, or scars  Lymphatic: no submandibular or cervical LAD  Neurologic: AAOx3; CNII-XII grossly intact; no focal deficits  Psychiatric: affect and characteristics of appearance, verbalizations, behaviors are appropriate

## 2022-11-30 NOTE — OCCUPATIONAL THERAPY INITIAL EVALUATION ADULT - RANGE OF MOTION EXAMINATION, UPPER EXTREMITY
R hand WNL/Left UE Active ROM was WFL (within functional limits) Clofazimine Counseling:  I discussed with the patient the risks of clofazimine including but not limited to skin and eye pigmentation, liver damage, nausea/vomiting, gastrointestinal bleeding and allergy.

## 2022-11-30 NOTE — DISCHARGE NOTE PROVIDER - HOSPITAL COURSE
HPI:  89 year old woman with hx of dementia (A&Ox1 at baseline), hypothyroid, glaucoma, macular degeneration, UTIs, and hyponatremia presented to ED after a mechanical fall while getting out of bed this AM. Patient lives at home with 24 hour aid. Patient was attempting to get out of bed, fell onto her right side. EMS called and brought her to ED. Patient normally ambulates with a walker, but has been unsteady and has fallen before (last 1 year ago). Patient had her vaginal estrogen ring replaced yesterday, but no other recent med changes.  History obtained from daughter, Cheryl, at bedside. Reports that the patient has not been complaining of any particular symptoms lately. Denies fevers, chills, CP, SOB, dysuria, bleeding, diarrhea, decreased PO intake.  In ED, T 97.5 HR 94, BPmax 206/81, later improved to 149/81 without intervention, initially on O2 but decreased to RA and SpO2 94% RR 16.  CT revealed R proximal humerus fracture and COVID PCR positive. Ortho consulted, no surgical intervention warranted given risk. Patient was seen by physical therapy. She was found to have a UTI and was started on ceftriaxone    89 year old woman with hx of dementia (A&Ox1 at baseline), hypothyroid, glaucoma, macular degeneration, UTIs, and hyponatremia presented to ED after a mechanical fall while getting out of bed this AM. Patient lives at home with 24 hour aid. Patient was attempting to get out of bed, fell onto her right side. EMS called and brought her to ED. Patient normally ambulates with a walker, but has been unsteady and has fallen before (last 1 year ago). Patient had her vaginal estrogen ring replaced yesterday, but no other recent med changes. History obtained from daughter, Cheryl, at bedside. Reports that the patient has not been complaining of any particular symptoms lately. Denies fevers, chills, CP, SOB, dysuria, bleeding, diarrhea, decreased PO intake. In ED, T 97.5 HR 94, BPmax 206/81, later improved to 149/81 without intervention, initially on O2 but decreased to RA and SpO2 94% RR 16. CT revealed R proximal humerus fracture and COVID PCR positive. Ortho consulted, no surgical intervention warranted given risk. Patient was seen by physical therapy. She was found to have a UTI per UA and was started on ceftriaxone on 12/01. UCx was unable to be done due to difficulties of clean-catch from urethral/vaginal atrophy. Pt will complete 3 days course of CTX for empirical coverage for uncomplicated UTI. At this point, pt was medically stable and optimized for dc to [facility].

## 2022-11-30 NOTE — DISCHARGE NOTE PROVIDER - NSDCCPCAREPLAN_GEN_ALL_CORE_FT
PRINCIPAL DISCHARGE DIAGNOSIS  Diagnosis: Closed fracture of right proximal humerus  Assessment and Plan of Treatment: You fractured the upper part of your humerus from the fall from bed. You underwent scans which showed that the bone was fractured in such a way that the ends . You were seen by orthopedic surgery, who determined that surgery was not warranted for this. You also worked with physical therapy while here.  The orthopedic surgeons recommend continuing to actively move your wrist/elbow/fingers. The sling can be taken off to start pendulum exercises in a week at rehab. Follow up with the orthopedic surgeon (Dr. Samuels) to further advance your range of motion.   For safety, we recommend having rails on the bed and continuing with the 24/7 aide supervision.      SECONDARY DISCHARGE DIAGNOSES  Diagnosis: 2019 novel coronavirus disease (COVID-19)  Assessment and Plan of Treatment: You were isolated for this. You did not develop any respirqatory symptoms and no interventions were necessary    Diagnosis: Urinary tract infection  Assessment and Plan of Treatment: You were notes to have this infection on a urinalysis. We started an antibiotic called ceftriaxone. We continued to monitor your white blood cell count and temperature     PRINCIPAL DISCHARGE DIAGNOSIS  Diagnosis: Closed fracture of right proximal humerus  Assessment and Plan of Treatment: You fractured the upper part of your humerus from the fall from bed. You underwent scans which showed that the bone was fractured in such a way that the ends . You were seen by orthopedic surgery, who determined that surgery was not warranted for this. You also worked with physical therapy while here.  The orthopedic surgeons recommend continuing to actively move your wrist/elbow/fingers. The sling can be taken off to start pendulum exercises in a week at rehab. Follow up with the orthopedic surgeon (Dr. Cameron) to further advance your range of motion.   For safety, we recommend having rails on the bed and continuing with the 24/7 aide supervision.      SECONDARY DISCHARGE DIAGNOSES  Diagnosis: 2019 novel coronavirus disease (COVID-19)  Assessment and Plan of Treatment: You were isolated for this. You did not develop any respirqatory symptoms and no interventions were necessary    Diagnosis: Urinary tract infection  Assessment and Plan of Treatment: You were notes to have this infection on a urinalysis. We started an antibiotic called ceftriaxone. We continued to monitor your white blood cell count and temperature     PRINCIPAL DISCHARGE DIAGNOSIS  Diagnosis: Closed fracture of right proximal humerus  Assessment and Plan of Treatment: You fractured the upper part of your humerus from the fall from bed. You underwent scans which showed that the bone was fractured in such a way that the ends . You were seen by orthopedic surgery, who determined that surgery was not warranted for this. You also worked with physical therapy while here.  The orthopedic surgeons recommend continuing to actively move your wrist/elbow/fingers. The sling can be taken off to start pendulum exercises in a week at rehab. Follow up with the orthopedic surgeon (Dr. Cameron) to further advance your range of motion.   For safety, we recommend having rails on the bed and continuing with the 24/7 aide supervision.      SECONDARY DISCHARGE DIAGNOSES  Diagnosis: 2019 novel coronavirus disease (COVID-19)  Assessment and Plan of Treatment: You were isolated for this. You did not develop any respirqatory symptoms and no interventions were necessary.    Diagnosis: Urinary tract infection  Assessment and Plan of Treatment: You were notes to have this infection on a urinalysis. We started an antibiotic called ceftriaxone. We continued to monitor your white blood cell count and temperature.  You will continue antibiotics until 12/03 to complete 3 day course for UTI.

## 2022-11-30 NOTE — DISCHARGE NOTE PROVIDER - NSDCCPTREATMENT_GEN_ALL_CORE_FT
PRINCIPAL PROCEDURE  Procedure: CT shoulder right  Findings and Treatment: INTERPRETATION:  HISTORY: Right shoulder fracture.  Helical CT imaging of the right shoulder was performed without   intravenous contrast. Sagittal and coronal reformats were provided. 3-D   reformats were performed on a separate workstation.  Correlation is made with radiograph  < end of copied text >   from same day.  FINDINGS:  The shoulder images were reformatted from the chest abdomen pelvis study.   The acromioclavicular joint and most cranial aspect of the humeral head   are excluded from the field-of-view.  There is redemonstration of an acute markedly comminuted fracture ofthe   proximal humeral diaphysis with impaction of fracture fragments. There is   slight apex anterior angulation and one half shaft width medial   displacement. Multiple butterfly fragments are seen along the posterior   and medial margin of the fracture. The posterior butterfly fragment is   the most prominent and measures up to 3.8 cm. This is slightly angled   anteriorly. There is no glenohumeral dislocation. The humeral joint space   is maintained.  There is no fatty atrophy of the musculature. Intramuscular and   myofascial edema is seen about the right proximal humerus.  IMPRESSION:  Acute comminuted displaced proximal right humeral diaphyseal fracture as   above.  Shoulder images were reformatted from the chest abdomen pelvis study. The   acromioclavicular joint and most cranial aspect of the humeral head are   excluded from the field-of-view. This was discussed with covering   orthopedics resident.< from: CT Shoulder No Cont, Right (11.29.22 @ 16:01) >        SECONDARY PROCEDURE  Procedure: CT shoulder right  Findings and Treatment:   < end of copied text >  IMPRESSION:  1.  Acute comminuted fracture of right proximal humeral shaft with mild   posterior displacement, mild to moderate apex anteromedial angulation,   and impaction of the dominant distal fragment. Cluster of mildly to   moderately displaced butterfly fragments arising from the posterolateral   cortex.  2.  Additional lower grade and chronic findings as detailed in the body   section of this report.  --- End of Report ---  KEYSHA LEYVA MD; Attending Radiologist  This document has been electronically signed. Nov 29 2022  7:00PM< from: CT Shoulder No Cont, Right (11.29.22 @ 17:47) >

## 2022-11-30 NOTE — DISCHARGE NOTE PROVIDER - NSDCFUADDAPPT_GEN_ALL_CORE_FT
Please call [phone] to schedule a follow-up appointment with Dr. Cameron in 2 weeks to discuss the management of shoulder fracture.     Please call [phone] to schedule a follow-up appointment with Dr. Florence (your primary care physician) in 1-2 weeks to discuss you general medical conditions.

## 2022-11-30 NOTE — OCCUPATIONAL THERAPY INITIAL EVALUATION ADULT - NSOTDISCHREC_GEN_A_CORE
TBD pending functional evaluation If pt returns home, pt would require assist with ALL ADL's and functional mobility and home OT. DME: hospital bed, W/C, mech lift, shower chair./[facility]-acute Rehab

## 2022-11-30 NOTE — OCCUPATIONAL THERAPY INITIAL EVALUATION ADULT - PERTINENT HX OF CURRENT PROBLEM, REHAB EVAL
89 year old woman with hx of dementia (A&Ox1 at baseline), hypothyroid, glaucoma, macular degeneration, UTIs, and hyponatremia presented to ED after a mechanical fall while getting out of bed this AM. Patient lives at home with 24 hour aid. Patient was attempting to get out of bed, fell onto her right side. EMS called and brought her to ED. Patient normally ambulates with a walker, but has been unsteady and has fallen before (last 1 year ago). Patient had her vaginal estrogen ring replaced yesterday, but no other recent med changes. Pt found to have R proximal humerus fracture, NWB RUE in sling, No acute orthopaedic intervention.  11/29 Xray Pelvis: Stable intact aligned partially visualized upper end of a previously seen left total hip prosthesis. (-) fx; XT Head (-)

## 2022-11-30 NOTE — PROGRESS NOTE ADULT - SUBJECTIVE AND OBJECTIVE BOX
PROGRESS NOTE:     Patient is a 89y old  Female who presents with a chief complaint of Fall, R humerus fx (30 Nov 2022 06:29)      SUBJECTIVE / OVERNIGHT EVENTS:    Patient seen and examined. No acute events overnight.    ADDITIONAL REVIEW OF SYSTEMS: 10 point ROS negative except per HPI    MEDICATIONS  (STANDING):  chlorhexidine 2% Cloths 1 Application(s) Topical daily  lactated ringers. 500 milliLiter(s) (75 mL/Hr) IV Continuous <Continuous>  latanoprost 0.005% Ophthalmic Solution 1 Drop(s) Both EYES at bedtime  levothyroxine 75 MICROGram(s) Oral daily  mupirocin 2% Nasal 1 Application(s) Both Nostrils two times a day  sertraline 50 milliGRAM(s) Oral daily    MEDICATIONS  (PRN):  acetaminophen     Tablet .. 650 milliGRAM(s) Oral every 6 hours PRN Temp greater or equal to 38C (100.4F), Mild Pain (1 - 3), Moderate Pain (4 - 6)  melatonin 3 milliGRAM(s) Oral at bedtime PRN Insomnia      CAPILLARY BLOOD GLUCOSE        I&O's Summary      PHYSICAL EXAM:  Vital Signs Last 24 Hrs  T(C): 37.2 (30 Nov 2022 04:18), Max: 37.2 (30 Nov 2022 04:18)  T(F): 98.9 (30 Nov 2022 04:18), Max: 98.9 (30 Nov 2022 04:18)  HR: 74 (30 Nov 2022 04:18) (66 - 94)  BP: 144/90 (30 Nov 2022 04:18) (144/90 - 206/81)  BP(mean): 109 (29 Nov 2022 16:19) (109 - 109)  RR: 16 (30 Nov 2022 04:18) (16 - 18)  SpO2: 95% (30 Nov 2022 04:18) (94% - 98%)    Parameters below as of 30 Nov 2022 04:18  Patient On (Oxygen Delivery Method): room air        CONSTITUTIONAL: NAD, well-developed  RESPIRATORY: Normal respiratory effort; lungs are clear to auscultation bilaterally  CARDIOVASCULAR: Regular rate and rhythm, normal S1 and S2, no murmur/rub/gallop; No lower extremity edema; Peripheral pulses are 2+ bilaterally  ABDOMEN: Nontender to palpation, normoactive bowel sounds, no rebound/guarding; No hepatosplenomegaly  MUSCLOSKELETAL: no clubbing or cyanosis of digits; no joint swelling or tenderness to palpation  NEURO: CN 2-12 grossly intact, moves all limbs spontaneously  PSYCH: A+O to person, place, and time; affect appropriate    LABS:                        9.9    6.48  )-----------( 116      ( 30 Nov 2022 06:50 )             30.9     11-30    132<L>  |  99  |  14  ----------------------------<  83  3.9   |  24  |  0.61    Ca    8.4      30 Nov 2022 06:50    TPro  6.8  /  Alb  3.7  /  TBili  0.5  /  DBili  x   /  AST  23  /  ALT  8<L>  /  AlkPhos  64  11-29    PT/INR - ( 30 Nov 2022 06:50 )   PT: 13.0 sec;   INR: 1.13 ratio         PTT - ( 30 Nov 2022 06:50 )  PTT:30.7 sec            RADIOLOGY & ADDITIONAL TESTS:  Results Reviewed: Y  Imaging Personally Reviewed: Y    COORDINATION OF CARE:  Care Discussed with Consultants/Other Providers [Y/N]: Y  Prior or Outpatient Records Reviewed [Y/N]: ALTAGRACIA Kearns PGY1  Can be reached on MS teams   PROGRESS NOTE:   Patient is a 89y old  Female who presents with a chief complaint of Fall, R humerus fx     SUBJECTIVE / OVERNIGHT EVENTS:  No acute events overnight. Ortho evaluated patient and deemed no acute intervention to be done. Patient's pain is moderately well controlled    MEDICATIONS  (STANDING):  chlorhexidine 2% Cloths 1 Application(s) Topical daily  lactated ringers. 500 milliLiter(s) (75 mL/Hr) IV Continuous <Continuous>  latanoprost 0.005% Ophthalmic Solution 1 Drop(s) Both EYES at bedtime  levothyroxine 75 MICROGram(s) Oral daily  mupirocin 2% Nasal 1 Application(s) Both Nostrils two times a day  sertraline 50 milliGRAM(s) Oral daily    MEDICATIONS  (PRN):  acetaminophen     Tablet .. 650 milliGRAM(s) Oral every 6 hours PRN Temp greater or equal to 38C (100.4F), Mild Pain (1 - 3), Moderate Pain (4 - 6)  melatonin 3 milliGRAM(s) Oral at bedtime PRN Insomnia    Vital Signs Last 24 Hrs  T(C): 37.2 (30 Nov 2022 04:18), Max: 37.2 (30 Nov 2022 04:18)  T(F): 98.9 (30 Nov 2022 04:18), Max: 98.9 (30 Nov 2022 04:18)  HR: 74 (30 Nov 2022 04:18) (66 - 94)  BP: 144/90 (30 Nov 2022 04:18) (144/90 - 206/81)  BP(mean): 109 (29 Nov 2022 16:19) (109 - 109)  RR: 16 (30 Nov 2022 04:18) (16 - 18)  SpO2: 95% (30 Nov 2022 04:18) (94% - 98%) on room air    PHYSICAL EXAM:  CONSTITUTIONAL: Elderly woman awake but minimal answers to questions  EYES: No conjunctival or scleral injection, non-icteric; PERRLA and symmetric  ENMT: No external nasal lesions; poor dentition; no pharyngeal injection or exudates, dry oral mucosa  NECK: Trachea midline without palpable neck mass; thyroid not enlarged and non-tender  RESPIRATORY: Breathing comfortably; lungs CTA without wheeze/rhonchi/rales  CARDIOVASCULAR: +S1S2, RRR, no M/G/R; no carotid bruits; pedal pulses full and symmetric; no lower extremity edema  GASTROINTESTINAL: No palpable masses or tenderness, +BS throughout, no rebound/guarding; no hepatosplenomegaly; no hernia palpated  LYMPHATIC: No cervical LAD or tenderness;   MUSCULOSKELETAL: RUE in sling, hematoma over R humerus, nontender, not tense.   VASCULAR: BL upper extremity radial pulses 2+, skin WWP  SKIN: No rashes or ulcers noted; no subcutaneous nodules or induration palpable. + skin tenting  NEUROLOGIC: CN II-XII intact; sensation intact in LEs b/l to light touch  PSYCHIATRIC: A+O x 1; lethargic    LABS:                      9.9    6.48  )-----------( 116                  30.9     132<L>  |  99  |  14  ----------------------------<  83  3.9   |  24  |  0.61    Ca    8.4      30 Nov 2022 06:50  TPro  6.8  /  Alb  3.7  /  TBili  0.5  /  DBili  x   /  AST  23  /  ALT  8<L>  /  AlkPhos  64  11-29  PT/INR - ( 30 Nov 2022 06:50 )   PT: 13.0 sec;   INR: 1.13 ratio    PTT - ( 30 Nov 2022 06:50 )  PTT:30.7 sec    COORDINATION OF CARE:  Care Discussed with Consultants/Other Providers [Y/N]: Y  Prior or Outpatient Records Reviewed [Y/N]: ALTAGRACIA Kearns PGY1  Can be reached on MS teams

## 2022-11-30 NOTE — PHYSICAL THERAPY INITIAL EVALUATION ADULT - ADDITIONAL COMMENTS
As per chart review, patient lives at home with 24 hour aid.  Patient normally ambulates with a walker, but has been unsteady and has fallen before (last 1 year ago). Patient lives in home with 24 hour aid. PTA pt ambulated household distances independently with rollator. Pt req assist from HHA with ADLs. Pt owns rollator, RW, transport chair, grab bars, and shower equipment.

## 2022-11-30 NOTE — PROGRESS NOTE ADULT - PROBLEM SELECTOR PLAN 11
GOC discussion had with Cheryl, as above, who discussed with other HCPs  KATRIN filled out, DNR/DNI signed  35 minutes spent on GOC GOC discussion had with Cheryl, as above, who discussed with other HCPs  MOLST filled out, DNR/DNI signed

## 2022-11-30 NOTE — DISCHARGE NOTE PROVIDER - NSDCFUSCHEDAPPT_GEN_ALL_CORE_FT
[facility] Physician [facility]  UROGYN [address]  Scheduled Appointment: 02/27/2023

## 2022-11-30 NOTE — DISCHARGE NOTE PROVIDER - NSDCMRMEDTOKEN_GEN_ALL_CORE_FT
levothyroxine 75 mcg (0.075 mg) oral tablet: 1 tab(s) orally once a day  sertraline 50 mg oral tablet: 1 tab(s) orally once a day  Xalatan 0.005% ophthalmic solution: 1 drop(s) to each affected eye once a day (in the evening)   acetaminophen 325 mg oral tablet: 2 tab(s) orally every 6 hours, As needed, Temp greater or equal to 38C (100.4F), Mild Pain (1 - 3)  chlorhexidine 2% topical pad: 1 application topically once a day  ketorolac: 15 milligram(s) intravenously every 6 hours, As Needed for moderate pain (4-6)  levothyroxine 75 mcg (0.075 mg) oral tablet: 1 tab(s) orally once a day  oxyCODONE 5 mg oral tablet: 1 tab(s) orally every 6 hours, As needed, Severe Pain (7 - 10)  sertraline 50 mg oral tablet: 1 tab(s) orally once a day  Xalatan 0.005% ophthalmic solution: 1 drop(s) to each affected eye once a day (in the evening)

## 2022-11-30 NOTE — DISCHARGE NOTE PROVIDER - CARE PROVIDERS DIRECT ADDRESSES
[email or web address]

## 2022-11-30 NOTE — PROGRESS NOTE ADULT - PROBLEM SELECTOR PLAN 2
Asymptomatic, no findings on CXR or CT chest to suggest indication for treatment  - Airborne Isolation  - Continue to monitor for hypoxia or symptoms  - Monitor off RDV or dex  - Holding AC due to fall/hematoma formation Asymptomatic, no findings on CXR or CT chest to suggest indication for treatment  - Airborne Isolation  - Continue to monitor for hypoxia or symptoms  - Holding AC due to fall/hematoma formation

## 2022-11-30 NOTE — DISCHARGE NOTE PROVIDER - PROVIDER TOKENS
PROVIDER:[TOKEN:[[id number]],FOLLOWUP:[2 weeks]] PROVIDER:[TOKEN:[[id number]],FOLLOWUP:[2 weeks]] PROVIDER:[TOKEN:[[id number]],FOLLOWUP:[2 weeks]],PROVIDER:[TOKEN:[[id number]],FOLLOWUP:[2 weeks],ESTABLISHEDPATIENT:[T]]

## 2022-11-30 NOTE — PHYSICAL THERAPY INITIAL EVALUATION ADULT - GAIT DEVIATIONS NOTED, PT EVAL
decreased filipe/increased time in double stance/decreased velocity of limb motion/decreased step length/decreased stride length/increased stride width/decreased weight-shifting ability

## 2022-11-30 NOTE — OCCUPATIONAL THERAPY INITIAL EVALUATION ADULT - LEVEL OF INDEPENDENCE: SIT/STAND, REHAB EVAL
pt w/ +fear despite education and encouragement, attempted stand multiple times, pt not cooperating at this time. maximum assist (25% patients effort)

## 2022-11-30 NOTE — OCCUPATIONAL THERAPY INITIAL EVALUATION ADULT - ADDITIONAL COMMENTS
Patient lives in apartment ([facility]) with 24 hour aide. PTA pt ambulated household distances independently with rollator, required assistance w/ ADLs. DME owned: rollator, transport chair, grab bars, raised toilet seat and shower chair.

## 2022-12-01 LAB
ANION GAP SERPL CALC-SCNC: 10 MMOL/L — SIGNIFICANT CHANGE UP (ref 5–17)
BUN SERPL-MCNC: 15 MG/DL — SIGNIFICANT CHANGE UP (ref 7–23)
CALCIUM SERPL-MCNC: 8.5 MG/DL — SIGNIFICANT CHANGE UP (ref 8.4–10.5)
CHLORIDE SERPL-SCNC: 98 MMOL/L — SIGNIFICANT CHANGE UP (ref 96–108)
CO2 SERPL-SCNC: 25 MMOL/L — SIGNIFICANT CHANGE UP (ref 22–31)
CREAT SERPL-MCNC: 0.61 MG/DL — SIGNIFICANT CHANGE UP (ref 0.5–1.3)
EGFR: 81 ML/MIN/1.73M2 — SIGNIFICANT CHANGE UP
GLUCOSE SERPL-MCNC: 97 MG/DL — SIGNIFICANT CHANGE UP (ref 70–99)
HCT VFR BLD CALC: 30.2 % — LOW (ref 34.5–45)
HGB BLD-MCNC: 10 G/DL — LOW (ref 11.5–15.5)
MAGNESIUM SERPL-MCNC: 1.9 MG/DL — SIGNIFICANT CHANGE UP (ref 1.6–2.6)
MCHC RBC-ENTMCNC: 30.5 PG — SIGNIFICANT CHANGE UP (ref 27–34)
MCHC RBC-ENTMCNC: 33.1 GM/DL — SIGNIFICANT CHANGE UP (ref 32–36)
MCV RBC AUTO: 92.1 FL — SIGNIFICANT CHANGE UP (ref 80–100)
NRBC # BLD: 0 /100 WBCS — SIGNIFICANT CHANGE UP (ref 0–0)
PHOSPHATE SERPL-MCNC: 2.7 MG/DL — SIGNIFICANT CHANGE UP (ref 2.5–4.5)
PLATELET # BLD AUTO: 112 K/UL — LOW (ref 150–400)
POTASSIUM SERPL-MCNC: 3.5 MMOL/L — SIGNIFICANT CHANGE UP (ref 3.5–5.3)
POTASSIUM SERPL-SCNC: 3.5 MMOL/L — SIGNIFICANT CHANGE UP (ref 3.5–5.3)
RBC # BLD: 3.28 M/UL — LOW (ref 3.8–5.2)
RBC # FLD: 13 % — SIGNIFICANT CHANGE UP (ref 10.3–14.5)
SODIUM SERPL-SCNC: 133 MMOL/L — LOW (ref 135–145)
WBC # BLD: 6.63 K/UL — SIGNIFICANT CHANGE UP (ref 3.8–10.5)
WBC # FLD AUTO: 6.63 K/UL — SIGNIFICANT CHANGE UP (ref 3.8–10.5)

## 2022-12-01 PROCEDURE — 99232 SBSQ HOSP IP/OBS MODERATE 35: CPT

## 2022-12-01 PROCEDURE — 99231 SBSQ HOSP IP/OBS SF/LOW 25: CPT | Mod: GC

## 2022-12-01 RX ORDER — CHLORHEXIDINE GLUCONATE 213 G/1000ML
1 SOLUTION TOPICAL DAILY
Refills: 0 | Status: DISCONTINUED | OUTPATIENT
Start: 2022-12-01 | End: 2022-12-02

## 2022-12-01 RX ORDER — POTASSIUM PHOSPHATE, MONOBASIC POTASSIUM PHOSPHATE, DIBASIC 236; 224 MG/ML; MG/ML
30 INJECTION, SOLUTION INTRAVENOUS ONCE
Refills: 0 | Status: COMPLETED | OUTPATIENT
Start: 2022-12-01 | End: 2022-12-01

## 2022-12-01 RX ORDER — OXYCODONE HYDROCHLORIDE 5 MG/1
1 TABLET ORAL
Qty: 0 | Refills: 0 | DISCHARGE
Start: 2022-12-01

## 2022-12-01 RX ORDER — CHLORHEXIDINE GLUCONATE 213 G/1000ML
1 SOLUTION TOPICAL
Qty: 0 | Refills: 0 | DISCHARGE
Start: 2022-12-01

## 2022-12-01 RX ORDER — ACETAMINOPHEN 500 MG
2 TABLET ORAL
Qty: 0 | Refills: 0 | DISCHARGE
Start: 2022-12-01

## 2022-12-01 RX ORDER — KETOROLAC TROMETHAMINE 30 MG/ML
15 SYRINGE (ML) INJECTION
Qty: 0 | Refills: 0 | DISCHARGE
Start: 2022-12-01

## 2022-12-01 RX ADMIN — SERTRALINE 50 MILLIGRAM(S): 25 TABLET, FILM COATED ORAL at 22:55

## 2022-12-01 RX ADMIN — MUPIROCIN 1 APPLICATION(S): 20 OINTMENT TOPICAL at 18:14

## 2022-12-01 RX ADMIN — MUPIROCIN 1 APPLICATION(S): 20 OINTMENT TOPICAL at 06:56

## 2022-12-01 RX ADMIN — POTASSIUM PHOSPHATE, MONOBASIC POTASSIUM PHOSPHATE, DIBASIC 83.33 MILLIMOLE(S): 236; 224 INJECTION, SOLUTION INTRAVENOUS at 12:10

## 2022-12-01 RX ADMIN — LATANOPROST 1 DROP(S): 0.05 SOLUTION/ DROPS OPHTHALMIC; TOPICAL at 22:55

## 2022-12-01 RX ADMIN — Medication 75 MICROGRAM(S): at 06:55

## 2022-12-01 RX ADMIN — CEFTRIAXONE 100 MILLIGRAM(S): 500 INJECTION, POWDER, FOR SOLUTION INTRAMUSCULAR; INTRAVENOUS at 04:30

## 2022-12-01 NOTE — PROGRESS NOTE ADULT - ASSESSMENT
89yFemale with R proximal humerus fracture  Pain control  NWB RUE in sling  PT/OT  No acute orthopaedic intervention  DVT ppx

## 2022-12-01 NOTE — PROGRESS NOTE ADULT - PROBLEM SELECTOR PLAN 2
Asymptomatic, no findings on CXR or CT chest to suggest indication for treatment  - Airborne Isolation  - Continue to monitor for hypoxia or symptoms  - Holding AC due to fall/hematoma formation Outreach completed by Care Coordinator 02/03/2017. See telephone encounter.

## 2022-12-01 NOTE — PROGRESS NOTE ADULT - PROBLEM SELECTOR PLAN 7
SBP initially 200s. Likely i/s/o pain. No history of HTN  - Pain control as needed, avoid hypotension due to fall history SBP initially 200s. 120s-170s over last 24hrs. Likely i/s/o pain. No history of HTN  - Pain control as needed, avoid hypotension due to fall history

## 2022-12-01 NOTE — PROGRESS NOTE ADULT - PROBLEM SELECTOR PLAN 11
GOC discussion had with Cheryl, as above, who discussed with other HCPs  MOLST filled out, DNR/DNI signed

## 2022-12-01 NOTE — PROGRESS NOTE ADULT - PROBLEM SELECTOR PLAN 1
R humerus fracture s/p mechanical fall from bed  - Appreciate ortho recs. Initially rec'd no surgical intervention but discussions ongoing at family request given that this arm is necessary for patient to ambulate given she uses a walker.   - No emergent surgery needed  - Tylenol, Toradol, and oxy for escalating levels of pain  - PT/OT R humerus fracture s/p mechanical fall from bed  - Appreciate ortho recs. Initially rec'd no surgical intervention but discussions ongoing at family request given that this arm is necessary for patient to ambulate given she uses a walker.   - No emergent surgery needed  - Tylenol, Toradol, and oxy for escalating levels of pain  - PT/OT recommendation for [facility]

## 2022-12-01 NOTE — PROGRESS NOTE ADULT - SUBJECTIVE AND OBJECTIVE BOX
PROGRESS NOTE:     Patient is a 89y old  Female who presents with a chief complaint of Fall, R humerus fx (01 Dec 2022 07:44)      SUBJECTIVE / OVERNIGHT EVENTS:    Patient seen and examined. No acute events overnight.    ADDITIONAL REVIEW OF SYSTEMS: 10 point ROS negative except per HPI    MEDICATIONS  (STANDING):  cefTRIAXone   IVPB 1000 milliGRAM(s) IV Intermittent every 24 hours  lactated ringers. 500 milliLiter(s) (75 mL/Hr) IV Continuous <Continuous>  latanoprost 0.005% Ophthalmic Solution 1 Drop(s) Both EYES at bedtime  levothyroxine 75 MICROGram(s) Oral daily  mupirocin 2% Nasal 1 Application(s) Both Nostrils two times a day  potassium phosphate IVPB 30 milliMole(s) IV Intermittent once  sertraline 50 milliGRAM(s) Oral daily    MEDICATIONS  (PRN):  acetaminophen     Tablet .. 650 milliGRAM(s) Oral every 6 hours PRN Temp greater or equal to 38C (100.4F), Mild Pain (1 - 3), Moderate Pain (4 - 6)  ketorolac   Injectable 15 milliGRAM(s) IV Push every 6 hours PRN Moderate Pain (4 - 6)  melatonin 3 milliGRAM(s) Oral at bedtime PRN Insomnia  oxyCODONE    IR 5 milliGRAM(s) Oral every 6 hours PRN Severe Pain (7 - 10)      CAPILLARY BLOOD GLUCOSE        I&O's Summary    2022 07:01  -  01 Dec 2022 07:00  --------------------------------------------------------  IN: 240 mL / OUT: 0 mL / NET: 240 mL        PHYSICAL EXAM:  Vital Signs Last 24 Hrs  T(C): 37.2 (01 Dec 2022 06:34), Max: 37.2 (01 Dec 2022 06:34)  T(F): 98.9 (01 Dec 2022 06:34), Max: 98.9 (01 Dec 2022 06:34)  HR: 71 (01 Dec 2022 06:34) (71 - 91)  BP: 174/94 (01 Dec 2022 06:34) (127/81 - 174/94)  BP(mean): --  RR: 18 (01 Dec 2022 06:34) (16 - 18)  SpO2: 95% (01 Dec 2022 06:34) (95% - 97%)    Parameters below as of 01 Dec 2022 06:34  Patient On (Oxygen Delivery Method): room air        CONSTITUTIONAL: NAD, well-developed  RESPIRATORY: Normal respiratory effort; lungs are clear to auscultation bilaterally  CARDIOVASCULAR: Regular rate and rhythm, normal S1 and S2, no murmur/rub/gallop; No lower extremity edema; Peripheral pulses are 2+ bilaterally  ABDOMEN: Nontender to palpation, normoactive bowel sounds, no rebound/guarding; No hepatosplenomegaly  MUSCLOSKELETAL: no clubbing or cyanosis of digits; no joint swelling or tenderness to palpation  NEURO: CN 2-12 grossly intact, moves all limbs spontaneously  PSYCH: A+O to person, place, and time; affect appropriate    LABS:                        10.0   6.63  )-----------( 112      ( 01 Dec 2022 06:43 )             30.2     12-    133<L>  |  98  |  15  ----------------------------<  97  3.5   |  25  |  0.61    Ca    8.5      01 Dec 2022 06:43  Phos  2.7     12-  Mg     1.9     12-    TPro  6.8  /  Alb  3.7  /  TBili  0.5  /  DBili  x   /  AST  23  /  ALT  8<L>  /  AlkPhos  64  11-29    PT/INR - ( 2022 06:50 )   PT: 13.0 sec;   INR: 1.13 ratio         PTT - ( 2022 06:50 )  PTT:30.7 sec      Urinalysis Basic - ( 2022 16:30 )    Color: Yellow / Appearance: Slightly Turbid / S.033 / pH: x  Gluc: x / Ketone: Small  / Bili: Negative / Urobili: Negative   Blood: x / Protein: 30 mg/dL / Nitrite: Positive   Leuk Esterase: Large / RBC: 5 /hpf / WBC 25 /HPF   Sq Epi: x / Non Sq Epi: 3 /hpf / Bacteria: Many          RADIOLOGY & ADDITIONAL TESTS:  Results Reviewed: Y  Imaging Personally Reviewed: Y    COORDINATION OF CARE:  Care Discussed with Consultants/Other Providers [Y/N]: Y  Prior or Outpatient Records Reviewed [Y/N]: ALTAGRACIA Kearns, PGY1  Can be reached on MS teams   PROGRESS NOTE:   Patient is a 89y old  Female who presents with a chief complaint of Fall, R humerus fx (01 Dec 2022 07:44)    SUBJECTIVE / OVERNIGHT EVENTS:  No acute events overnight.    MEDICATIONS  (STANDING):  cefTRIAXone   IVPB 1000 milliGRAM(s) IV Intermittent every 24 hours  lactated ringers. 500 milliLiter(s) (75 mL/Hr) IV Continuous <Continuous>  latanoprost 0.005% Ophthalmic Solution 1 Drop(s) Both EYES at bedtime  levothyroxine 75 MICROGram(s) Oral daily  mupirocin 2% Nasal 1 Application(s) Both Nostrils two times a day  potassium phosphate IVPB 30 milliMole(s) IV Intermittent once  sertraline 50 milliGRAM(s) Oral daily    MEDICATIONS  (PRN):  acetaminophen     Tablet .. 650 milliGRAM(s) Oral every 6 hours PRN Temp greater or equal to 38C (100.4F), Mild Pain (1 - 3), Moderate Pain (4 - 6)  ketorolac   Injectable 15 milliGRAM(s) IV Push every 6 hours PRN Moderate Pain (4 - 6)  melatonin 3 milliGRAM(s) Oral at bedtime PRN Insomnia  oxyCODONE    IR 5 milliGRAM(s) Oral every 6 hours PRN Severe Pain (7 - 10)    I&O's Summary  2022 07:01  -  01 Dec 2022 07:00  --------------------------------------------------------  IN: 240 mL / OUT: 0 mL / NET: 240 mL    Vital Signs Last 24 Hrs  T(C): 37.2 (01 Dec 2022 06:34), Max: 37.2 (01 Dec 2022 06:34)  T(F): 98.9 (01 Dec 2022 06:34), Max: 98.9 (01 Dec 2022 06:34)  HR: 71 (01 Dec 2022 06:34) (71 - 91)  BP: 174/94 (01 Dec 2022 06:34) (127/81 - 174/94)  RR: 18 (01 Dec 2022 06:34) (16 - 18)  SpO2: 95% (01 Dec 2022 06:34) (95% - 97%) on room air    PHYSICAL EXAM:  CONSTITUTIONAL: Elderly woman awake, vocal but not following conversation  EYES: No conjunctival or scleral injection, non-icteric; PERRLA and symmetric  ENMT: No external nasal lesions; poor dentition; no pharyngeal injection or exudates, dry oral mucosa  NECK: Trachea midline without palpable neck mass; thyroid not enlarged and non-tender  RESPIRATORY: Breathing comfortably; lungs CTA without wheeze/rhonchi/rales  CARDIOVASCULAR: +S1S2, RRR, no M/G/R; no carotid bruits; pedal pulses full and symmetric; no lower extremity edema  GASTROINTESTINAL: No palpable masses or tenderness, +BS throughout, no rebound/guarding; no hepatosplenomegaly; no hernia palpated  LYMPHATIC: No cervical LAD or tenderness;   MUSCULOSKELETAL: RUE in sling, hematoma over R humerus, arm tender to palpation, not tense.   VASCULAR: BL upper extremity radial pulses 2+, skin WWP  SKIN: No rashes or ulcers noted; no subcutaneous nodules or induration palpable. + skin tenting  NEUROLOGIC: CN II-XII intact; sensation intact in LEs b/l to light touch  PSYCHIATRIC: A+O x 1; lethargic    LABS:             10.0   6.63  )-----------( 112      ( 01 Dec 2022 06:43 )             30.2     133<L>  |  98  |  15  ----------------------------<  97  3.5   |  25  |  0.61    Ca    8.5      01 Dec 2022 06:43  Phos  2.7     12-  Mg     1.9     12-    TPro  6.8  /  Alb  3.7  /  TBili  0.5  /  DBili  x   /  AST  23  /  ALT  8<L>  /  AlkPhos  64  11-29  PT/INR - ( 2022 06:50 )   PT: 13.0 sec;   INR: 1.13 ratio    PTT - ( 2022 06:50 )  PTT:30.7 sec    Urinalysis Basic - ( 2022 16:30 )  Color: Yellow / Appearance: Slightly Turbid / S.033 / pH: x  Gluc: x / Ketone: Small  / Bili: Negative / Urobili: Negative   Blood: x / Protein: 30 mg/dL / Nitrite: Positive   Leuk Esterase: Large / RBC: 5 /hpf / WBC 25 /HPF   Sq Epi: x / Non Sq Epi: 3 /hpf / Bacteria: Many    COORDINATION OF CARE:  Care Discussed with Consultants/Other Providers [Y/N]: Y  Prior or Outpatient Records Reviewed [Y/N]: ALTAGRACIA Kearns PGY1  Can be reached on MS teams

## 2022-12-01 NOTE — PROGRESS NOTE ADULT - PROBLEM SELECTOR PLAN 3
Currently below baseline mental status, possibly hospital associated delirium with fall. CTH without bleed  - F/u UA  - Redirection as needed  - Pain control  - Avoid sedating medications Currently below baseline mental status, possibly hospital associated delirium with fall. CTH without bleed  - Redirection as needed  - Pain control  - Avoid sedating medications

## 2022-12-01 NOTE — PROGRESS NOTE ADULT - SUBJECTIVE AND OBJECTIVE BOX
Orthopaedic Surgery Progress Note    Subjective:   Patient seen and examined this morning. Pain well controlled overnight. Tolerating sling well. Denies any new numbness, tingling, weakness, paresthesias.     Objective:  T(C): 37.2 (22 @ 06:34), Max: 37.2 (22 @ 06:34)  HR: 71 (22 @ 06:34) (71 - 91)  BP: 174/94 (22 @ 06:34) (127/81 - 174/94)  RR: 18 (22 @ 06:34) (16 - 18)  SpO2: 95% (22 @ 06:34) (95% - 97%)  Wt(kg): --     @ 07:01  -   @ 07:00  --------------------------------------------------------  IN: 240 mL / OUT: 0 mL / NET: 240 mL        Physical Exam:    Gen: NAD  Resp: Unlabored breathing  PE RUE:  Skin intact,    SILT axillary/med/rad/ulnar  +Motor AIN/PIN/Ulnar  +painless elbow/wrist ROM,   shoulder ROM limited 2/2 pain,   Sling in place and adjusted  2+radial pulse, soft compartments.                          10.0   6.63  )-----------( 112      ( 01 Dec 2022 06:43 )             30.2         133<L>  |  98  |  15  ----------------------------<  97  3.5   |  25  |  0.61    Ca    8.5      01 Dec 2022 06:43  Phos  2.7       Mg     1.9         TPro  6.8  /  Alb  3.7  /  TBili  0.5  /  DBili  x   /  AST  23  /  ALT  8<L>  /  AlkPhos  64  11-    PT/INR - ( 2022 06:50 )   PT: 13.0 sec;   INR: 1.13 ratio         PTT - ( 2022 06:50 )  PTT:30.7 sec  Urinalysis Basic - ( 2022 16:30 )    Color: Yellow / Appearance: Slightly Turbid / S.033 / pH: x  Gluc: x / Ketone: Small  / Bili: Negative / Urobili: Negative   Blood: x / Protein: 30 mg/dL / Nitrite: Positive   Leuk Esterase: Large / RBC: 5 /hpf / WBC 25 /HPF   Sq Epi: x / Non Sq Epi: 3 /hpf / Bacteria: Many

## 2022-12-01 NOTE — PROGRESS NOTE ADULT - PROBLEM SELECTOR PLAN 8
Mechanical fall, witnessed.   - Fracture management as above  - PT/OT  - Fall precautions  - VitD level WNL Mechanical fall, witnessed.   - Fracture management as above  - PT/OT  - Fall precautions; son working on getting rails for bed at home due to previous falls from bed  - VitD level WNL

## 2022-12-01 NOTE — PROGRESS NOTE ADULT - PROBLEM SELECTOR PLAN 10
DVT: SCDs d/t RUE fx and hematoma  Diet: Regular, S&S  Dispo: PT/OT likely rehab DVT: SCDs d/t RUE fx and hematoma  Diet: Regular, S&S, assist with feeding  Dispo: [facility]

## 2022-12-02 ENCOUNTER — TRANSCRIPTION ENCOUNTER (OUTPATIENT)
Age: 87
End: 2022-12-02

## 2022-12-02 VITALS
SYSTOLIC BLOOD PRESSURE: 166 MMHG | RESPIRATION RATE: 18 BRPM | HEART RATE: 74 BPM | TEMPERATURE: 98 F | OXYGEN SATURATION: 97 % | DIASTOLIC BLOOD PRESSURE: 88 MMHG

## 2022-12-02 LAB
ANION GAP SERPL CALC-SCNC: 12 MMOL/L — SIGNIFICANT CHANGE UP (ref 5–17)
BUN SERPL-MCNC: 14 MG/DL — SIGNIFICANT CHANGE UP (ref 7–23)
CALCIUM SERPL-MCNC: 8.3 MG/DL — LOW (ref 8.4–10.5)
CHLORIDE SERPL-SCNC: 99 MMOL/L — SIGNIFICANT CHANGE UP (ref 96–108)
CO2 SERPL-SCNC: 22 MMOL/L — SIGNIFICANT CHANGE UP (ref 22–31)
CREAT SERPL-MCNC: 0.61 MG/DL — SIGNIFICANT CHANGE UP (ref 0.5–1.3)
EGFR: 81 ML/MIN/1.73M2 — SIGNIFICANT CHANGE UP
GLUCOSE SERPL-MCNC: 74 MG/DL — SIGNIFICANT CHANGE UP (ref 70–99)
HCT VFR BLD CALC: 30.5 % — LOW (ref 34.5–45)
HGB BLD-MCNC: 10.1 G/DL — LOW (ref 11.5–15.5)
MAGNESIUM SERPL-MCNC: 2 MG/DL — SIGNIFICANT CHANGE UP (ref 1.6–2.6)
MCHC RBC-ENTMCNC: 30.5 PG — SIGNIFICANT CHANGE UP (ref 27–34)
MCHC RBC-ENTMCNC: 33.1 GM/DL — SIGNIFICANT CHANGE UP (ref 32–36)
MCV RBC AUTO: 92.1 FL — SIGNIFICANT CHANGE UP (ref 80–100)
NRBC # BLD: 0 /100 WBCS — SIGNIFICANT CHANGE UP (ref 0–0)
PHOSPHATE SERPL-MCNC: 3.7 MG/DL — SIGNIFICANT CHANGE UP (ref 2.5–4.5)
PLATELET # BLD AUTO: 118 K/UL — LOW (ref 150–400)
POTASSIUM SERPL-MCNC: 3.9 MMOL/L — SIGNIFICANT CHANGE UP (ref 3.5–5.3)
POTASSIUM SERPL-SCNC: 3.9 MMOL/L — SIGNIFICANT CHANGE UP (ref 3.5–5.3)
RBC # BLD: 3.31 M/UL — LOW (ref 3.8–5.2)
RBC # FLD: 13.2 % — SIGNIFICANT CHANGE UP (ref 10.3–14.5)
SODIUM SERPL-SCNC: 133 MMOL/L — LOW (ref 135–145)
WBC # BLD: 6.93 K/UL — SIGNIFICANT CHANGE UP (ref 3.8–10.5)
WBC # FLD AUTO: 6.93 K/UL — SIGNIFICANT CHANGE UP (ref 3.8–10.5)

## 2022-12-02 PROCEDURE — 72170 X-RAY EXAM OF PELVIS: CPT

## 2022-12-02 PROCEDURE — 99231 SBSQ HOSP IP/OBS SF/LOW 25: CPT | Mod: GC

## 2022-12-02 PROCEDURE — 85025 COMPLETE CBC W/AUTO DIFF WBC: CPT

## 2022-12-02 PROCEDURE — 97161 PT EVAL LOW COMPLEX 20 MIN: CPT

## 2022-12-02 PROCEDURE — 87640 STAPH A DNA AMP PROBE: CPT

## 2022-12-02 PROCEDURE — 72125 CT NECK SPINE W/O DYE: CPT | Mod: MA

## 2022-12-02 PROCEDURE — 96365 THER/PROPH/DIAG IV INF INIT: CPT

## 2022-12-02 PROCEDURE — 74177 CT ABD & PELVIS W/CONTRAST: CPT | Mod: MA

## 2022-12-02 PROCEDURE — 87637 SARSCOV2&INF A&B&RSV AMP PRB: CPT

## 2022-12-02 PROCEDURE — 83690 ASSAY OF LIPASE: CPT

## 2022-12-02 PROCEDURE — 85027 COMPLETE CBC AUTOMATED: CPT

## 2022-12-02 PROCEDURE — 85610 PROTHROMBIN TIME: CPT

## 2022-12-02 PROCEDURE — 99285 EMERGENCY DEPT VISIT HI MDM: CPT | Mod: 25

## 2022-12-02 PROCEDURE — 86850 RBC ANTIBODY SCREEN: CPT

## 2022-12-02 PROCEDURE — 86900 BLOOD TYPING SEROLOGIC ABO: CPT

## 2022-12-02 PROCEDURE — 83735 ASSAY OF MAGNESIUM: CPT

## 2022-12-02 PROCEDURE — 73030 X-RAY EXAM OF SHOULDER: CPT

## 2022-12-02 PROCEDURE — 86901 BLOOD TYPING SEROLOGIC RH(D): CPT

## 2022-12-02 PROCEDURE — 80053 COMPREHEN METABOLIC PANEL: CPT

## 2022-12-02 PROCEDURE — 97535 SELF CARE MNGMENT TRAINING: CPT

## 2022-12-02 PROCEDURE — 73200 CT UPPER EXTREMITY W/O DYE: CPT

## 2022-12-02 PROCEDURE — 71260 CT THORAX DX C+: CPT | Mod: MA

## 2022-12-02 PROCEDURE — 80048 BASIC METABOLIC PNL TOTAL CA: CPT

## 2022-12-02 PROCEDURE — 81001 URINALYSIS AUTO W/SCOPE: CPT

## 2022-12-02 PROCEDURE — 83036 HEMOGLOBIN GLYCOSYLATED A1C: CPT

## 2022-12-02 PROCEDURE — 87641 MR-STAPH DNA AMP PROBE: CPT

## 2022-12-02 PROCEDURE — 85730 THROMBOPLASTIN TIME PARTIAL: CPT

## 2022-12-02 PROCEDURE — 70450 CT HEAD/BRAIN W/O DYE: CPT | Mod: MA

## 2022-12-02 PROCEDURE — 97530 THERAPEUTIC ACTIVITIES: CPT

## 2022-12-02 PROCEDURE — 84443 ASSAY THYROID STIM HORMONE: CPT

## 2022-12-02 PROCEDURE — 76377 3D RENDER W/INTRP POSTPROCES: CPT

## 2022-12-02 PROCEDURE — 73060 X-RAY EXAM OF HUMERUS: CPT

## 2022-12-02 PROCEDURE — 99239 HOSP IP/OBS DSCHRG MGMT >30: CPT

## 2022-12-02 PROCEDURE — 83605 ASSAY OF LACTIC ACID: CPT

## 2022-12-02 PROCEDURE — 84100 ASSAY OF PHOSPHORUS: CPT

## 2022-12-02 PROCEDURE — 82652 VIT D 1 25-DIHYDROXY: CPT

## 2022-12-02 PROCEDURE — 97166 OT EVAL MOD COMPLEX 45 MIN: CPT

## 2022-12-02 RX ORDER — CEFPODOXIME PROXETIL 100 MG
100 TABLET ORAL EVERY 12 HOURS
Refills: 0 | Status: DISCONTINUED | OUTPATIENT
Start: 2022-12-03 | End: 2022-12-02

## 2022-12-02 RX ORDER — CEFPODOXIME PROXETIL 100 MG
1 TABLET ORAL
Qty: 2 | Refills: 0
Start: 2022-12-02 | End: 2022-12-02

## 2022-12-02 RX ADMIN — CEFTRIAXONE 100 MILLIGRAM(S): 500 INJECTION, POWDER, FOR SOLUTION INTRAMUSCULAR; INTRAVENOUS at 05:28

## 2022-12-02 RX ADMIN — Medication 75 MICROGRAM(S): at 05:29

## 2022-12-02 RX ADMIN — MUPIROCIN 1 APPLICATION(S): 20 OINTMENT TOPICAL at 05:29

## 2022-12-02 RX ADMIN — CHLORHEXIDINE GLUCONATE 1 APPLICATION(S): 213 SOLUTION TOPICAL at 15:11

## 2022-12-02 NOTE — PROGRESS NOTE ADULT - ASSESSMENT
89 year old woman with hx of dementia (A&Ox1 at baseline), hypothyroid, glaucoma, macular degeneration, UTIs, and hyponatremia presented to ED after a mechanical fall with R humerus fx and asymptomatic COVID-19 infection 89 year old woman with hx of dementia (A&Ox1 at baseline), hypothyroid, glaucoma, macular degeneration, UTIs, and hyponatremia presented to ED after a mechanical fall with R humerus fx and asymptomatic COVID-19 infection. No surgical intervention per orthopedic. Pending dc to [facility] due to 5 days of covid positive.

## 2022-12-02 NOTE — PROGRESS NOTE ADULT - SUBJECTIVE AND OBJECTIVE BOX
Pt seen/examined. Doing well. Pain controlled. No acute overnight complaints or events.    T(C): 36.7 (12-02-22 @ 05:30), Max: 37.1 (12-01-22 @ 21:55)  HR: 70 (12-02-22 @ 05:30) (70 - 75)  BP: 172/86 (12-02-22 @ 05:30) (148/86 - 173/92)  RR: 18 (12-02-22 @ 05:30) (16 - 18)  SpO2: 95% (12-02-22 @ 05:30) (94% - 96%)  Wt(kg): --  - Gen: NAD    Physical Exam:    Gen: NAD  Resp: Unlabored breathing  PE RUE:  Skin intact,    SILT axillary/med/rad/ulnar  +Motor AIN/PIN/Ulnar  +painless elbow/wrist ROM,   shoulder ROM limited 2/2 pain,   Sling in place and adjusted  2+radial pulse, soft compartments.    89yFemale with R proximal humerus fracture s/p  11/29    Pain control  NWB RUE in sling  PT/OT  No acute orthopaedic intervention  DVT ppx

## 2022-12-02 NOTE — PROGRESS NOTE ADULT - PROBLEM SELECTOR PROBLEM 11
DNR (do not resuscitate) discussion

## 2022-12-02 NOTE — PROGRESS NOTE ADULT - SUBJECTIVE AND OBJECTIVE BOX
PROGRESS NOTE:   Authored by Maurice Rosales, PGY-1     Patient is a 89y old  Female who presents with a chief complaint of Fall, R humerus fx (01 Dec 2022 10:55)      SUBJECTIVE / OVERNIGHT EVENTS:  NAEON. Pt was seen at the bedside and lying comfortably in the bed. ROS is limited due to her baseline mental status. Pain ?     ADDITIONAL REVIEW OF SYSTEMS:    MEDICATIONS  (STANDING):  cefTRIAXone   IVPB 1000 milliGRAM(s) IV Intermittent every 24 hours  chlorhexidine 2% Cloths 1 Application(s) Topical daily  lactated ringers. 500 milliLiter(s) (75 mL/Hr) IV Continuous <Continuous>  latanoprost 0.005% Ophthalmic Solution 1 Drop(s) Both EYES at bedtime  levothyroxine 75 MICROGram(s) Oral daily  mupirocin 2% Nasal 1 Application(s) Both Nostrils two times a day  sertraline 50 milliGRAM(s) Oral daily    MEDICATIONS  (PRN):  acetaminophen     Tablet .. 650 milliGRAM(s) Oral every 6 hours PRN Temp greater or equal to 38C (100.4F), Mild Pain (1 - 3), Moderate Pain (4 - 6)  ketorolac   Injectable 15 milliGRAM(s) IV Push every 6 hours PRN Moderate Pain (4 - 6)  melatonin 3 milliGRAM(s) Oral at bedtime PRN Insomnia  oxyCODONE    IR 5 milliGRAM(s) Oral every 6 hours PRN Severe Pain (7 - 10)      CAPILLARY BLOOD GLUCOSE        I&O's Summary    2022 07:01  -  01 Dec 2022 07:00  --------------------------------------------------------  IN: 240 mL / OUT: 0 mL / NET: 240 mL        PHYSICAL EXAM:  Vital Signs Last 24 Hrs  T(C): 36.7 (02 Dec 2022 05:30), Max: 37.1 (01 Dec 2022 21:55)  T(F): 98 (02 Dec 2022 05:30), Max: 98.7 (01 Dec 2022 21:55)  HR: 70 (02 Dec 2022 05:30) (70 - 75)  BP: 172/86 (02 Dec 2022 05:30) (148/86 - 173/92)  BP(mean): --  RR: 18 (02 Dec 2022 05:30) (16 - 18)  SpO2: 95% (02 Dec 2022 05:30) (94% - 96%)    Parameters below as of 02 Dec 2022 05:30  Patient On (Oxygen Delivery Method): room air        CONSTITUTIONAL: Elderly woman awake, vocal but not following conversation  EYES: No conjunctival or scleral injection, non-icteric; PERRLA and symmetric  ENMT: No external nasal lesions; poor dentition; no pharyngeal injection or exudates, dry oral mucosa  NECK: Trachea midline without palpable neck mass; thyroid not enlarged and non-tender  RESPIRATORY: Breathing comfortably; lungs CTA without wheeze/rhonchi/rales  CARDIOVASCULAR: +S1S2, RRR, no M/G/R; no carotid bruits; pedal pulses full and symmetric; no lower extremity edema  GASTROINTESTINAL: No palpable masses or tenderness, +BS throughout, no rebound/guarding; no hepatosplenomegaly; no hernia palpated  LYMPHATIC: No cervical LAD or tenderness;   MUSCULOSKELETAL: RUE in sling, hematoma over R humerus, arm tender to palpation, not tense.   VASCULAR: BL upper extremity radial pulses 2+, skin WWP  SKIN: No rashes or ulcers noted; no subcutaneous nodules or induration palpable. + skin tenting  NEUROLOGIC: CN II-XII intact; sensation intact in LEs b/l to light touch  PSYCHIATRIC: A+O x 1; lethargic    LABS:                        10.0   6.63  )-----------( 112      ( 01 Dec 2022 06:43 )             30.2     12    133<L>  |  98  |  15  ----------------------------<  97  3.5   |  25  |  0.61    Ca    8.5      01 Dec 2022 06:43  Phos  2.7     12  Mg     1.9     12      PT/INR - ( 2022 06:50 )   PT: 13.0 sec;   INR: 1.13 ratio         PTT - ( 2022 06:50 )  PTT:30.7 sec      Urinalysis Basic - ( 2022 16:30 )    Color: Yellow / Appearance: Slightly Turbid / S.033 / pH: x  Gluc: x / Ketone: Small  / Bili: Negative / Urobili: Negative   Blood: x / Protein: 30 mg/dL / Nitrite: Positive   Leuk Esterase: Large / RBC: 5 /hpf / WBC 25 /HPF   Sq Epi: x / Non Sq Epi: 3 /hpf / Bacteria: Many          RADIOLOGY & ADDITIONAL TESTS:  Results Reviewed:   Imaging Personally Reviewed:  Electrocardiogram Personally Reviewed:    COORDINATION OF CARE:  Care Discussed with Consultants/Other Providers [Y/N]:  Prior or Outpatient Records Reviewed [Y/N]:   PROGRESS NOTE:   Authored by Maurice Rosales, PGY-1     Patient is a 89y old  Female who presents with a chief complaint of Fall, R humerus fx (01 Dec 2022 10:55)      SUBJECTIVE / OVERNIGHT EVENTS:  NAEON. Pt was seen at the bedside and lying comfortably in the bed. Pt denied fever, chill, headache, chest pain, n/v. Right shoulder Pain is well controlled without pain med prn.     ADDITIONAL REVIEW OF SYSTEMS:    MEDICATIONS  (STANDING):  cefTRIAXone   IVPB 1000 milliGRAM(s) IV Intermittent every 24 hours  chlorhexidine 2% Cloths 1 Application(s) Topical daily  lactated ringers. 500 milliLiter(s) (75 mL/Hr) IV Continuous <Continuous>  latanoprost 0.005% Ophthalmic Solution 1 Drop(s) Both EYES at bedtime  levothyroxine 75 MICROGram(s) Oral daily  mupirocin 2% Nasal 1 Application(s) Both Nostrils two times a day  sertraline 50 milliGRAM(s) Oral daily    MEDICATIONS  (PRN):  acetaminophen     Tablet .. 650 milliGRAM(s) Oral every 6 hours PRN Temp greater or equal to 38C (100.4F), Mild Pain (1 - 3), Moderate Pain (4 - 6)  ketorolac   Injectable 15 milliGRAM(s) IV Push every 6 hours PRN Moderate Pain (4 - 6)  melatonin 3 milliGRAM(s) Oral at bedtime PRN Insomnia  oxyCODONE    IR 5 milliGRAM(s) Oral every 6 hours PRN Severe Pain (7 - 10)      CAPILLARY BLOOD GLUCOSE        I&O's Summary    2022 07:01  -  01 Dec 2022 07:00  --------------------------------------------------------  IN: 240 mL / OUT: 0 mL / NET: 240 mL        PHYSICAL EXAM:  Vital Signs Last 24 Hrs  T(C): 36.7 (02 Dec 2022 05:30), Max: 37.1 (01 Dec 2022 21:55)  T(F): 98 (02 Dec 2022 05:30), Max: 98.7 (01 Dec 2022 21:55)  HR: 70 (02 Dec 2022 05:30) (70 - 75)  BP: 172/86 (02 Dec 2022 05:30) (148/86 - 173/92)  BP(mean): --  RR: 18 (02 Dec 2022 05:30) (16 - 18)  SpO2: 95% (02 Dec 2022 05:30) (94% - 96%)    Parameters below as of 02 Dec 2022 05:30  Patient On (Oxygen Delivery Method): room air        CONSTITUTIONAL: Elderly woman awake, vocal but not following conversation  EYES: No conjunctival or scleral injection, non-icteric; PERRLA and symmetric  ENMT: No external nasal lesions; poor dentition; no pharyngeal injection or exudates, dry oral mucosa  NECK: Trachea midline without palpable neck mass; thyroid not enlarged and non-tender  RESPIRATORY: Breathing comfortably; lungs CTA without wheeze/rhonchi/rales  CARDIOVASCULAR: +S1S2, RRR, no M/G/R; no carotid bruits; pedal pulses full and symmetric; no lower extremity edema  GASTROINTESTINAL: No palpable masses or tenderness, +BS throughout, no rebound/guarding; no hepatosplenomegaly; no hernia palpated  LYMPHATIC: No cervical LAD or tenderness;   MUSCULOSKELETAL: RUE in sling, hematoma over R humerus, arm tender to palpation, not tense.   VASCULAR: BL upper extremity radial pulses 2+, skin WWP  SKIN: No rashes or ulcers noted; no subcutaneous nodules or induration palpable. + skin tenting  NEUROLOGIC: CN II-XII intact; sensation intact in LEs b/l to light touch  PSYCHIATRIC: A+O x 1; lethargic    LABS:                        10.0   6.63  )-----------( 112      ( 01 Dec 2022 06:43 )             30.2     12-    133<L>  |  98  |  15  ----------------------------<  97  3.5   |  25  |  0.61    Ca    8.5      01 Dec 2022 06:43  Phos  2.7     12  Mg     1.9     12      PT/INR - ( 2022 06:50 )   PT: 13.0 sec;   INR: 1.13 ratio         PTT - ( 2022 06:50 )  PTT:30.7 sec      Urinalysis Basic - ( 2022 16:30 )    Color: Yellow / Appearance: Slightly Turbid / S.033 / pH: x  Gluc: x / Ketone: Small  / Bili: Negative / Urobili: Negative   Blood: x / Protein: 30 mg/dL / Nitrite: Positive   Leuk Esterase: Large / RBC: 5 /hpf / WBC 25 /HPF   Sq Epi: x / Non Sq Epi: 3 /hpf / Bacteria: Many          RADIOLOGY & ADDITIONAL TESTS:  Results Reviewed:   Imaging Personally Reviewed:  Electrocardiogram Personally Reviewed:    COORDINATION OF CARE:  Care Discussed with Consultants/Other Providers [Y/N]:  Prior or Outpatient Records Reviewed [Y/N]:

## 2022-12-02 NOTE — PROGRESS NOTE ADULT - PROBLEM SELECTOR PLAN 8
Mechanical fall, witnessed.   - Fracture management as above  - PT/OT  - Fall precautions; son working on getting rails for bed at home due to previous falls from bed  - VitD level WNL

## 2022-12-02 NOTE — PROGRESS NOTE ADULT - REASON FOR ADMISSION
Fall, R humerus fx

## 2022-12-02 NOTE — PROGRESS NOTE ADULT - PROBLEM SELECTOR PROBLEM 3
Unspecified dementia, severe, without behavioral disturbance, psychotic disturbance, mood disturbance, and anxiety

## 2022-12-02 NOTE — PROGRESS NOTE ADULT - PROBLEM SELECTOR PROBLEM 1
[>50% of Time Spent on Coordination of Care for  ___] : Greater than 50% of the encounter time was spent on coordination of care for [unfilled]
[Time Spent: ___ minutes] : I have spent [unfilled] minutes of face to face time with the patient
Humerus fracture

## 2022-12-02 NOTE — PROGRESS NOTE ADULT - PROBLEM SELECTOR PLAN 3
Currently below baseline mental status, possibly hospital associated delirium with fall. CTH without bleed  - Redirection as needed  - Pain control  - Avoid sedating medications

## 2022-12-02 NOTE — PROGRESS NOTE ADULT - PROBLEM SELECTOR PLAN 1
R humerus fracture s/p mechanical fall from bed  - Appreciate ortho recs. Initially rec'd no surgical intervention but discussions ongoing at family request given that this arm is necessary for patient to ambulate given she uses a walker.   - No emergent surgery needed  - Tylenol, Toradol, and oxy for escalating levels of pain  - PT/OT recommendation for JOSEP R humerus fracture s/p mechanical fall from bed  - Appreciate ortho recs. Initially rec'd no surgical intervention but discussions ongoing at family request given that this arm is necessary for patient to ambulate given she uses a walker.   - No emergent surgery needed  - Tylenol, Toradol, and oxy for escalating levels of pain  - PT/OT recommendation for JOSEP  - outpatient ortho follow up

## 2022-12-02 NOTE — PROGRESS NOTE ADULT - PROBLEM SELECTOR PLAN 7
SBP initially 200s. 120s-170s over last 24hrs. Likely i/s/o pain. No history of HTN  - Pain control as needed, avoid hypotension due to fall history

## 2022-12-02 NOTE — PROGRESS NOTE ADULT - ATTENDING COMMENTS
Continue sling.  DC planning
R proximal humerus fracture.  Sling immobilization. OOB w PT
OOB. Sling immobilization.  DC planning
89F PMH dementia (AAOx1), hypothyroidism, glaucoma, macular degeneration, presents following fall and found with humerus fracture and incidental COVID-19.     #Humoral fracture  - Plan as per orthopedics, will follow up with recommendations  - Pain management as above     #COVID-19  No hypoxia, patient is without complaints.   - CTM, no steroids indicated at this time     Remainder as above
89F PMH dementia (AAOx1), hypothyroidism, glaucoma, macular degeneration, presents following fall and found with humerus fracture and incidental COVID-19.     #Humoral fracture  - Plan as per orthopedics, no acute surgical intervention  - PT consulted and for JOSEP     #COVID-19  No hypoxia, patient is without complaints.   - CTM, no steroids indicated at this time     Remainder as above

## 2022-12-02 NOTE — DISCHARGE NOTE NURSING/CASE MANAGEMENT/SOCIAL WORK - PATIENT PORTAL LINK FT
You can access the FollowMyHealth Patient Portal offered by [facility] by registering at the following website: [email or web address]. By joining [facility]’s FollowMyHealth portal, you will also be able to view your health information using other applications (apps) compatible with our system.

## 2022-12-02 NOTE — PROGRESS NOTE ADULT - PROBLEM SELECTOR PROBLEM 5
Exudative age-related macular degeneration, right eye, stage unspecified

## 2022-12-02 NOTE — PROGRESS NOTE ADULT - PROBLEM SELECTOR PLAN 2
Asymptomatic, no findings on CXR or CT chest to suggest indication for treatment  - Airborne Isolation  - Continue to monitor for hypoxia or symptoms  - Holding AC due to fall/hematoma formation

## 2022-12-02 NOTE — PROGRESS NOTE ADULT - PROBLEM SELECTOR PROBLEM 6
Primary open-angle glaucoma, bilateral, mild stage

## 2022-12-02 NOTE — DISCHARGE NOTE NURSING/CASE MANAGEMENT/SOCIAL WORK - NSDCPEFALRISK_GEN_ALL_CORE
For information on Fall & Injury Prevention, visit: [email or web address] OR  [email or web address] OR  https://www.cdc.gov/steadi/patient.html

## 2022-12-02 NOTE — DISCHARGE NOTE NURSING/CASE MANAGEMENT/SOCIAL WORK - NSDCFUADDAPPT_GEN_ALL_CORE_FT
Please call [phone] to schedule a follow-up appointment with Dr. Cameron in 2 weeks to discuss the management of shoulder fracture.     Please call [phone] to schedule a follow-up appointment with Dr. Florence (your primary care physician) in 1-2 weeks to discuss you general medical conditions.

## 2022-12-02 NOTE — PROGRESS NOTE ADULT - PROBLEM SELECTOR PLAN 4
Continue home sertraline 50mg daily

## 2022-12-03 RX ORDER — CEFPODOXIME PROXETIL 100 MG
1 TABLET ORAL
Qty: 0 | Refills: 0 | DISCHARGE
Start: 2022-12-03 | End: 2022-12-03

## 2022-12-08 PROBLEM — F43.21 ADJUSTMENT DISORDER WITH DEPRESSED MOOD: Chronic | Status: ACTIVE | Noted: 2022-11-29

## 2022-12-08 PROBLEM — D64.9 ANEMIA, UNSPECIFIED: Chronic | Status: ACTIVE | Noted: 2022-11-29

## 2022-12-08 PROBLEM — D58.0 HEREDITARY SPHEROCYTOSIS: Chronic | Status: ACTIVE | Noted: 2022-11-29

## 2022-12-13 ENCOUNTER — APPOINTMENT (OUTPATIENT)
Dept: UROGYNECOLOGY | Facility: CLINIC | Age: 87
End: 2022-12-13

## 2022-12-19 ENCOUNTER — APPOINTMENT (OUTPATIENT)
Dept: ORTHOPEDIC SURGERY | Facility: CLINIC | Age: 87
End: 2022-12-19

## 2023-02-15 ENCOUNTER — APPOINTMENT (OUTPATIENT)
Dept: UROGYNECOLOGY | Facility: CLINIC | Age: 88
End: 2023-02-15
Payer: MEDICARE

## 2023-02-15 PROCEDURE — 99213 OFFICE O/P EST LOW 20 MIN: CPT

## 2023-02-15 NOTE — DISCUSSION/SUMMARY
[FreeTextEntry1] : 1. Vaginal atrophy\par - Estring replaced today without issue\par - No complaints \par \par 2. Urethral prolapse\par - Continue topical vaginal estrogen twice per week at night, increase as needed if urethral bleeding or irritation noted \par - Reviewed bleeding precautions \par \par RTO in 3 months, or sooner, as needed

## 2023-02-15 NOTE — PHYSICAL EXAM
[Chaperone Present] : A chaperone was present in the examining room during all aspects of the physical examination [No Acute Distress] : in no acute distress [Well developed] : well developed [Well Nourished] : ~L well nourished [Warm and Dry] : was warm and dry to touch [Vulvar Atrophy] : vulvar atrophy [Labia Majora] : were normal [Labia Minora] : were normal [Mucosal Prolapse] : had a mucosal prolapse [Normal] : was normal [Normal Appearance] : general appearance was normal [Atrophy] : atrophy [Tenderness] : ~T no ~M abdominal tenderness observed [Distended] : not distended

## 2023-02-15 NOTE — HISTORY OF PRESENT ILLNESS
[FreeTextEntry1] : Genie is a 89yo with dementia, vulvovaginal atrophy, urethral prolapse and recurrent UTI who presents today for follow up. She has been managed with Estring, last exchanged 11/28/22. Her daughter and caregiver report that she is doing well. Since last visit, she fractured her humerus and [facility] She had some urethral bleeding after application of clotrimazole cream at rehab, which has since resolved.  They continue to apply topical estrogen to urethral area for her urethral prolapse twice per week.

## 2023-03-07 ENCOUNTER — RX RENEWAL (OUTPATIENT)
Age: 88
End: 2023-03-07

## 2023-04-28 NOTE — PATIENT PROFILE ADULT - TOBACCO USE
[FreeTextEntry1] : Mr. CHARITY ANGELA is a 44 year M suffering from low back and right leg pain, that based upon today's subjective complaints, physical examination, and MRI review, is likely secondary to lumbar radiculopathy\par \par >> Medications\par \par Chronic opioid use for non-malignant pain, in particular at high doses would not be recommended since it can potentially lead to hyperalgesia (hypersensitivity), tolerance and addiction. \par  \par Gabapentin 400 m ghs\par  \par >> Interventions\par  \par Arrange for repeat RIGHT L4 and L5 transforaminal epidural steroid injection under fluoroscopic guidance.  \par \par >> Therapy and Other Modalities\par  \par Continue with daily home stretching regimen\par \par >> Imaging and Other Studies\par \par See Media \par \par >> Consults\par \par None ordered 
Never smoker

## 2023-05-24 ENCOUNTER — APPOINTMENT (OUTPATIENT)
Dept: UROGYNECOLOGY | Facility: CLINIC | Age: 88
End: 2023-05-24
Payer: MEDICARE

## 2023-05-24 PROCEDURE — 99213 OFFICE O/P EST LOW 20 MIN: CPT

## 2023-05-24 NOTE — HISTORY OF PRESENT ILLNESS
[FreeTextEntry1] : Genie is a 89yo with dementia, vulvovaginal atrophy, urethral prolapse and recurrent UTI who presents today for follow up. She vaginal atrophy has been managed with Estring, last exchanged 2/2023.  Her caregiver reports that she has been bleeding from her urethral prolapse.  They continue to apply topical estrogen to urethral area for her urethral prolapse twice per week. They deny any vaginal bleeding.

## 2023-05-24 NOTE — DISCUSSION/SUMMARY
[FreeTextEntry1] :  Vaginal atrophy:\par - Estring replaced today without issue\par - No complaints \par \par  Urethral prolapse:\par - Apply topical vaginal estrogen every night  if urethral bleeding or irritation noted and then BIW when bleeding stops\par - Reviewed bleeding precautions \par \par RTO in 3 months, or sooner, as needed

## 2023-05-24 NOTE — REASON FOR VISIT
[Follow-Up Visit_____] : a follow-up visit for [unfilled] [Formal Caregiver] : formal caregiver [Other: _____] : [unfilled]

## 2023-05-24 NOTE — PHYSICAL EXAM
[Chaperone Present] : A chaperone was present in the examining room during all aspects of the physical examination [No Acute Distress] : in no acute distress [Well developed] : well developed [Well Nourished] : ~L well nourished [Warm and Dry] : was warm and dry to touch [Vulvar Atrophy] : vulvar atrophy [Labia Majora] : were normal [Labia Minora] : were normal [Mucosal Prolapse] : had a mucosal prolapse [Normal] : was normal [Normal Appearance] : general appearance was normal [Atrophy] : atrophy [No Bleeding] : there was no active vaginal bleeding [Tenderness] : ~T no ~M abdominal tenderness observed [Distended] : not distended [de-identified] : pt in a wheelchair [FreeTextEntry3] : scant bleeding from urethral prolapse

## 2023-05-31 ENCOUNTER — RX RENEWAL (OUTPATIENT)
Age: 88
End: 2023-05-31

## 2023-06-07 ENCOUNTER — APPOINTMENT (OUTPATIENT)
Dept: UROGYNECOLOGY | Facility: CLINIC | Age: 88
End: 2023-06-07

## 2023-08-14 ENCOUNTER — RX RENEWAL (OUTPATIENT)
Age: 88
End: 2023-08-14

## 2023-08-14 DIAGNOSIS — N39.0 URINARY TRACT INFECTION, SITE NOT SPECIFIED: ICD-10-CM

## 2023-08-21 ENCOUNTER — APPOINTMENT (OUTPATIENT)
Dept: UROGYNECOLOGY | Facility: CLINIC | Age: 88
End: 2023-08-21
Payer: MEDICARE

## 2023-08-21 VITALS
HEART RATE: 99 BPM | BODY MASS INDEX: 18.33 KG/M2 | WEIGHT: 110 LBS | HEIGHT: 65 IN | SYSTOLIC BLOOD PRESSURE: 116 MMHG | DIASTOLIC BLOOD PRESSURE: 76 MMHG

## 2023-08-21 PROCEDURE — 99213 OFFICE O/P EST LOW 20 MIN: CPT

## 2023-08-21 NOTE — END OF VISIT
[FreeTextEntry3] : pt seen, I have reviewed the above and agree with all pertinent clinical information including history and physical examination and agree with treatment plan.

## 2023-08-21 NOTE — PHYSICAL EXAM
[Chaperone Present] : A chaperone was present in the examining room during all aspects of the physical examination [No Acute Distress] : in no acute distress [Well developed] : well developed [Well Nourished] : ~L well nourished [Respirations regular] : ~T respiratory rate was regular [Warm and Dry] : was warm and dry to touch [Normal] : normal external genitalia [Labia Majora] : were normal [Labia Minora] : were normal [Mucosal Prolapse] : had a mucosal prolapse [Atrophy] : atrophy [Oriented x3] : disoriented to person, place, or time [Normal Memory] : ~T memory was ~L impaired [Tenderness] : ~T no ~M abdominal tenderness observed [Distended] : not distended [FreeTextEntry3] : erythematous urethral prolapse with some areas of irritation and mild bleeding

## 2023-08-21 NOTE — DISCUSSION/SUMMARY
[FreeTextEntry1] : 1) Vaginal atrophy -estring replaced today -RTO in 3 months for replacement  2) Urethral prolapse -Increase topical estrogen to three times per week if urethral bleeding/irritation is noted -Bleeding precautions reviewed

## 2023-08-21 NOTE — REASON FOR VISIT
[Follow-Up Visit_____] : a follow-up visit for [unfilled] [Family Member] : family member [Formal Caregiver] : formal caregiver [________] : [unfilled]

## 2023-08-24 NOTE — PATIENT PROFILE ADULT - AGENT'S NAME
"Med rec completed per patient at bedside, dispense history per [facility] on [facility], and [facility] on [facility] ([phone]) to verify patient's midodrine.  Allergies reviewed with patient.  No outpatient antibiotics within the last 30 days.  Patient's preferred pharmacy: [facility] on [facility].    Per [facility], midodrine 10 mg, 1 tablet 3 times per day, was last dispensed to patient 1/2023 for #60 tablets (20 days supply). Patient says that she \"had a lot saved up\" and states that she is still using this medication.    Thiamine (vitamin B-1) 100 mg, 1 tablet every day, was last dispensed to patient via [facility] on 8/17/2023 for #30 tablets (30 days supply). Patient unable to verify when she last took the thiamine.  "  Cheryl Crowder

## 2023-10-17 ENCOUNTER — LABORATORY RESULT (OUTPATIENT)
Age: 88
End: 2023-10-17

## 2023-10-17 ENCOUNTER — APPOINTMENT (OUTPATIENT)
Dept: GERIATRICS | Facility: CLINIC | Age: 88
End: 2023-10-17
Payer: MEDICARE

## 2023-10-17 VITALS
DIASTOLIC BLOOD PRESSURE: 82 MMHG | SYSTOLIC BLOOD PRESSURE: 133 MMHG | TEMPERATURE: 97.6 F | RESPIRATION RATE: 14 BRPM | WEIGHT: 122 LBS | HEART RATE: 90 BPM | BODY MASS INDEX: 20.3 KG/M2 | OXYGEN SATURATION: 98 %

## 2023-10-17 DIAGNOSIS — E03.9 HYPOTHYROIDISM, UNSPECIFIED: ICD-10-CM

## 2023-10-17 DIAGNOSIS — R26.81 UNSTEADINESS ON FEET: ICD-10-CM

## 2023-10-17 DIAGNOSIS — F32.A DEPRESSION, UNSPECIFIED: ICD-10-CM

## 2023-10-17 DIAGNOSIS — Z71.89 OTHER SPECIFIED COUNSELING: ICD-10-CM

## 2023-10-17 DIAGNOSIS — R73.09 OTHER ABNORMAL GLUCOSE: ICD-10-CM

## 2023-10-17 DIAGNOSIS — F03.C0 UNSPECIFIED DEMENTIA, SEVERE, WITHOUT BEHAVIORAL DISTURBANCE, PSYCHOTIC DISTURBANCE, MOOD DISTURBANCE, AND ANXIETY: ICD-10-CM

## 2023-10-17 DIAGNOSIS — Z23 ENCOUNTER FOR IMMUNIZATION: ICD-10-CM

## 2023-10-17 PROCEDURE — G0439: CPT

## 2023-10-17 PROCEDURE — 99497 ADVNCD CARE PLAN 30 MIN: CPT

## 2023-10-17 PROCEDURE — 90662 IIV NO PRSV INCREASED AG IM: CPT

## 2023-10-17 PROCEDURE — G0008: CPT

## 2023-10-17 RX ORDER — CEPHALEXIN 500 MG/1
500 CAPSULE ORAL
Qty: 14 | Refills: 0 | Status: DISCONTINUED | COMMUNITY
Start: 2021-06-18 | End: 2023-10-17

## 2023-10-17 RX ORDER — NITROFURANTOIN (MONOHYDRATE/MACROCRYSTALS) 25; 75 MG/1; MG/1
100 CAPSULE ORAL TWICE DAILY
Qty: 10 | Refills: 0 | Status: DISCONTINUED | COMMUNITY
Start: 2022-09-13 | End: 2023-10-17

## 2023-10-17 RX ORDER — CEPHALEXIN 500 MG/1
500 CAPSULE ORAL
Qty: 14 | Refills: 0 | Status: DISCONTINUED | COMMUNITY
Start: 2022-07-13 | End: 2023-10-17

## 2023-10-17 RX ORDER — CEPHALEXIN 500 MG/1
500 CAPSULE ORAL
Qty: 14 | Refills: 0 | Status: DISCONTINUED | COMMUNITY
Start: 2022-10-14 | End: 2023-10-17

## 2023-10-17 RX ORDER — FOSFOMYCIN TROMETHAMINE 3 G/1
3 POWDER ORAL
Qty: 1 | Refills: 0 | Status: DISCONTINUED | COMMUNITY
Start: 2023-08-14 | End: 2023-10-17

## 2023-10-17 RX ORDER — CEPHALEXIN 500 MG/1
500 CAPSULE ORAL
Qty: 10 | Refills: 0 | Status: DISCONTINUED | COMMUNITY
Start: 2020-12-09 | End: 2023-10-17

## 2023-10-18 LAB
ALBUMIN SERPL ELPH-MCNC: 3.9 G/DL
ALP BLD-CCNC: 74 U/L
ALT SERPL-CCNC: 9 U/L
ANION GAP SERPL CALC-SCNC: 13 MMOL/L
AST SERPL-CCNC: 20 U/L
BILIRUB SERPL-MCNC: 0.4 MG/DL
BUN SERPL-MCNC: 29 MG/DL
CALCIUM SERPL-MCNC: 9.1 MG/DL
CHLORIDE SERPL-SCNC: 99 MMOL/L
CO2 SERPL-SCNC: 24 MMOL/L
CREAT SERPL-MCNC: 0.72 MG/DL
EGFR: 75 ML/MIN/1.73M2
ESTIMATED AVERAGE GLUCOSE: 111 MG/DL
GLUCOSE SERPL-MCNC: 164 MG/DL
HBA1C MFR BLD HPLC: 5.5 %
HCT VFR BLD CALC: 39.2 %
HGB BLD-MCNC: 13.2 G/DL
MCHC RBC-ENTMCNC: 32 PG
MCHC RBC-ENTMCNC: 33.7 GM/DL
MCV RBC AUTO: 94.9 FL
PLATELET # BLD AUTO: 232 K/UL
POTASSIUM SERPL-SCNC: 4.5 MMOL/L
PROT SERPL-MCNC: 7 G/DL
RBC # BLD: 4.13 M/UL
RBC # FLD: 13.8 %
SODIUM SERPL-SCNC: 136 MMOL/L
TSH SERPL-ACNC: 9.01 UIU/ML
WBC # FLD AUTO: 8.58 K/UL

## 2023-11-15 ENCOUNTER — APPOINTMENT (OUTPATIENT)
Dept: UROGYNECOLOGY | Facility: CLINIC | Age: 88
End: 2023-11-15
Payer: MEDICARE

## 2023-11-15 VITALS
BODY MASS INDEX: 16.66 KG/M2 | WEIGHT: 100 LBS | HEIGHT: 65 IN | SYSTOLIC BLOOD PRESSURE: 144 MMHG | DIASTOLIC BLOOD PRESSURE: 88 MMHG | HEART RATE: 99 BPM

## 2023-11-15 PROCEDURE — 99213 OFFICE O/P EST LOW 20 MIN: CPT

## 2023-11-15 RX ORDER — ESTRADIOL 0.1 MG/G
0.1 CREAM VAGINAL
Qty: 1 | Refills: 2 | Status: ACTIVE | COMMUNITY
Start: 2022-10-24 | End: 1900-01-01

## 2023-11-30 PROBLEM — R73.09 ELEVATED HEMOGLOBIN A1C: Status: ACTIVE | Noted: 2023-11-30

## 2024-02-11 NOTE — PROCEDURE
[Good Fit] : fits well [Follow Up] : follow up [Erythema] : erythema noted [Refit] : refit is not needed [Erosion] : no evidence of erosion [FreeTextEntry2] : removed and replaced without issue [None] : no bleeding [de-identified] : at urethral meatus where prolapse is located

## 2024-02-11 NOTE — HISTORY OF PRESENT ILLNESS
[FreeTextEntry1] : Genie is a 89F here for estring exchange. Per her caregiver, patient has had no vaginal bleeding or other complaints since ***.  She is using vaginal estrogen over the urethral prolapse twice per week and replens on the other 5 days***. Estring last exchanged 11/15/2023.

## 2024-02-11 NOTE — DISCUSSION/SUMMARY
[FreeTextEntry1] : 1) Vaginal atrophy -estring replaced today -RTO in 3 months for replacement  2) Urethral prolapse -continue to apply vaginal estrogen to urethral prolapse twice weekly -continue replens on days not using vaginal estrogen.  Patient's caregiver and daughter verbalized understanding.  All questions answered.

## 2024-02-11 NOTE — PHYSICAL EXAM
[Chaperone Present] : A chaperone was present in the examining room during all aspects of the physical examination [No Acute Distress] : in no acute distress [Well developed] : well developed [Well Nourished] : ~L well nourished [Oriented x3] : disoriented to person, place, or time [Normal Memory] : ~T memory was ~L impaired [Respirations regular] : ~T respiratory rate was regular [Tenderness] : ~T no ~M abdominal tenderness observed [Distended] : not distended [Warm and Dry] : was warm and dry to touch [Normal] : normal external genitalia [Mucosal Prolapse] : had a mucosal prolapse [Labia Minora] : were normal [Labia Majora] : were normal [FreeTextEntry3] : erythematous urethral prolapse [Atrophy] : atrophy

## 2024-02-12 ENCOUNTER — APPOINTMENT (OUTPATIENT)
Dept: UROGYNECOLOGY | Facility: CLINIC | Age: 89
End: 2024-02-12

## 2024-02-26 ENCOUNTER — APPOINTMENT (OUTPATIENT)
Dept: UROGYNECOLOGY | Facility: CLINIC | Age: 89
End: 2024-02-26
Payer: MEDICARE

## 2024-02-26 PROCEDURE — 99213 OFFICE O/P EST LOW 20 MIN: CPT

## 2024-02-26 NOTE — REASON FOR VISIT
[Follow-Up Visit_____] : a follow-up visit for [unfilled] [Formal Caregiver] : formal caregiver [Family Member] : family member [________] : [unfilled]

## 2024-02-26 NOTE — PROCEDURE
[Follow Up] : follow up [Good Fit] : fits well [Erythema] : erythema noted [None] : no bleeding [Erosion] : no evidence of erosion [Refit] : refit is not needed [Discharge] : no vaginal discharge [Infection] : no evidence of infection [FreeTextEntry2] : removed and replaced without issue [de-identified] : at urethral meatus where prolapse is located [FreeTextEntry8] : Routine perineal care reviewed

## 2024-02-26 NOTE — PHYSICAL EXAM
[Chaperone Present] : A chaperone was present in the examining room during all aspects of the physical examination [No Acute Distress] : in no acute distress [Well developed] : well developed [Well Nourished] : ~L well nourished [Good Hygeine] : demonstrates good hygeine [Warm and Dry] : was warm and dry to touch [Normal] : normal external genitalia [Labia Minora] : were normal [Labia Majora] : were normal [Mucosal Prolapse] : had a mucosal prolapse [Atrophy] : atrophy [Oriented x3] : disoriented to person, place, or time [Normal Memory] : ~T memory was ~L impaired [de-identified] : Wheelchair bound; former caretaker helps with all ADLs  [FreeTextEntry3] : erythematous urethral prolapse

## 2024-02-26 NOTE — HISTORY OF PRESENT ILLNESS
[FreeTextEntry1] : Genie is a 89yo F here for estring exchange. Per her caregiver, patient has had no vaginal bleeding or other complaints since her last visit in November 2023.  She is using vaginal estrogen over the urethral prolapse twice per week and replens on the other 5 days. Estring last exchanged 11/15/2023.

## 2024-05-18 ENCOUNTER — NON-APPOINTMENT (OUTPATIENT)
Age: 89
End: 2024-05-18

## 2024-05-18 RX ORDER — FOSFOMYCIN TROMETHAMINE 3 G/1
3 POWDER ORAL
Qty: 1 | Refills: 0 | Status: ACTIVE | COMMUNITY
Start: 2024-05-18 | End: 1900-01-01

## 2024-05-22 ENCOUNTER — APPOINTMENT (OUTPATIENT)
Dept: UROGYNECOLOGY | Facility: CLINIC | Age: 89
End: 2024-05-22
Payer: MEDICARE

## 2024-05-22 VITALS
SYSTOLIC BLOOD PRESSURE: 120 MMHG | HEIGHT: 65 IN | DIASTOLIC BLOOD PRESSURE: 50 MMHG | HEART RATE: 80 BPM | WEIGHT: 100 LBS | BODY MASS INDEX: 16.66 KG/M2

## 2024-05-22 DIAGNOSIS — N95.2 POSTMENOPAUSAL ATROPHIC VAGINITIS: ICD-10-CM

## 2024-05-22 DIAGNOSIS — N36.8 OTHER SPECIFIED DISORDERS OF URETHRA: ICD-10-CM

## 2024-05-22 LAB
BILIRUB UR QL STRIP: NORMAL
CLARITY UR: NORMAL
COLLECTION METHOD: NORMAL
GLUCOSE UR-MCNC: NORMAL
HCG UR QL: 0.2 EU/DL
HGB UR QL STRIP.AUTO: NORMAL
KETONES UR-MCNC: NORMAL
LEUKOCYTE ESTERASE UR QL STRIP: NORMAL
NITRITE UR QL STRIP: NORMAL
PH UR STRIP: 6.5
PROT UR STRIP-MCNC: NORMAL
SP GR UR STRIP: 1.01

## 2024-05-22 PROCEDURE — 99459 PELVIC EXAMINATION: CPT

## 2024-05-22 PROCEDURE — 99213 OFFICE O/P EST LOW 20 MIN: CPT

## 2024-05-22 RX ORDER — ESTRADIOL 2 MG/1
7.5 SYSTEM VAGINAL
Qty: 1 | Refills: 6 | Status: ACTIVE | COMMUNITY
Start: 2021-08-20 | End: 1900-01-01

## 2024-05-22 NOTE — DISCUSSION/SUMMARY
[FreeTextEntry1] : 1. Vaginal atrophy - Continue with estring, replaced today without difficulty  - RTO in 3 months for exchange   2. Urethral prolapse - Continue topical vaginal estrogen to urethral meatus twice weekly  3. Urinary urgency  - UA and culture sent today on clean catch specimen they brought from home, will await final culture prior to treating   RTO in 3 months for Estring exchange, all questions answered and addressed

## 2024-05-22 NOTE — HISTORY OF PRESENT ILLNESS
[FreeTextEntry1] : Genie is a 89yo with vaginal atrophy, urethral prolapse and recurrent UTI who presents today for follow up. She was last seen in February for exchange of her estring. Of note, her son, Vickie Crowder called on 5/18 to report urinary urgency and hesitancy, concerned for UTI. Due to difficulty getting her to Urgent Care, he requested a script for Fosfomycin be sent to pharmacy, understanding risks of inability to determine if infection present and sensitivities without a culture. Today they report she has not taken Fosfomycin yet but they did bring a clean catch specimen from home.

## 2024-05-22 NOTE — PHYSICAL EXAM
[Chaperone Present] : A chaperone was present in the examining room during all aspects of the physical examination [[id number]] : A chaperone was present during the pelvic exam. [No Acute Distress] : in no acute distress [Well developed] : well developed [Well Nourished] : ~L well nourished [Labia Majora] : were normal [Labia Minora] : were normal [Mucosal Prolapse] : had a mucosal prolapse [Normal] : was normal [Normal Appearance] : general appearance was normal [Atrophy] : atrophy [Tenderness] : ~T no ~M abdominal tenderness observed [Distended] : not distended [FreeTextEntry4] : limited speculum exam due to patient difficulty tolerating exam; Estring removed and new Estring placed (Lot #[id number])

## 2024-05-24 LAB
APPEARANCE: ABNORMAL
BACTERIA UR CULT: NORMAL
BACTERIA: ABNORMAL /HPF
BILIRUBIN URINE: NEGATIVE
BLOOD URINE: NEGATIVE
CAST: 2 /LPF
COLOR: YELLOW
EPITHELIAL CELLS: 21 /HPF
GLUCOSE QUALITATIVE U: NEGATIVE MG/DL
HYALINE CASTS: PRESENT
KETONES URINE: NEGATIVE MG/DL
LEUKOCYTE ESTERASE URINE: ABNORMAL
MICROSCOPIC-UA: NORMAL
NITRITE URINE: NEGATIVE
PH URINE: 6.5
PROTEIN URINE: NEGATIVE MG/DL
RED BLOOD CELLS URINE: NORMAL /HPF
REVIEW: NORMAL
SPECIFIC GRAVITY URINE: 1.02
UROBILINOGEN URINE: 0.2 MG/DL
WHITE BLOOD CELLS URINE: 3 /HPF

## 2024-07-02 ENCOUNTER — APPOINTMENT (OUTPATIENT)
Dept: UROGYNECOLOGY | Facility: CLINIC | Age: 89
End: 2024-07-02

## 2024-07-22 ENCOUNTER — RX RENEWAL (OUTPATIENT)
Age: 89
End: 2024-07-22

## 2024-08-14 ENCOUNTER — APPOINTMENT (OUTPATIENT)
Dept: UROGYNECOLOGY | Facility: CLINIC | Age: 89
End: 2024-08-14
Payer: MEDICARE

## 2024-08-14 DIAGNOSIS — N36.8 OTHER SPECIFIED DISORDERS OF URETHRA: ICD-10-CM

## 2024-08-14 DIAGNOSIS — N95.2 POSTMENOPAUSAL ATROPHIC VAGINITIS: ICD-10-CM

## 2024-08-14 DIAGNOSIS — N39.0 URINARY TRACT INFECTION, SITE NOT SPECIFIED: ICD-10-CM

## 2024-08-14 PROCEDURE — 99213 OFFICE O/P EST LOW 20 MIN: CPT

## 2024-08-14 NOTE — REASON FOR VISIT
[Recurrent Urinary Infections] : recurrent urinary infections [Follow-Up Visit_____] : a follow-up visit for [unfilled] [Family Member] : family member [Formal Caregiver] : formal caregiver [________] : [unfilled]

## 2024-08-14 NOTE — DISCUSSION/SUMMARY
[FreeTextEntry1] : 1) Vaginal atrophy -estring replaced today -RTO in 3 months for replacement  2) Urethral prolapse -continue to apply vaginal estrogen to urethral prolapse twice weekly (refill sent to pharmacy) -continue replens on days not using vaginal estrogen.  Patient's caregiver and daughter verbalized understanding.  All questions answered.

## 2024-08-14 NOTE — HISTORY OF PRESENT ILLNESS
[FreeTextEntry1] : Genie is an 89F here for estring exchange. Per her caregiver, patient has had no vaginal bleeding or other complaints. She is using vaginal estrogen over the urethral prolapse twice per week and replens on the other 5 days. Estring last exchanged 5/22/2023.

## 2024-08-14 NOTE — PROCEDURE
[Follow Up] : follow up [Good Fit] : fits well [Erythema] : erythema noted [None] : no bleeding [Refit] : refit is not needed [Erosion] : no evidence of erosion [FreeTextEntry2] : removed and replaced without issue [de-identified] : at urethral meatus where prolapse is located

## 2024-08-14 NOTE — PHYSICAL EXAM
[Chaperone Present] : A chaperone was present in the examining room during all aspects of the physical examination [No Acute Distress] : in no acute distress [Well developed] : well developed [Well Nourished] : ~L well nourished [Respirations regular] : ~T respiratory rate was regular [Warm and Dry] : was warm and dry to touch [Normal] : normal external genitalia [Labia Majora] : were normal [Labia Minora] : were normal [Mucosal Prolapse] : had a mucosal prolapse [Atrophy] : atrophy [Oriented x3] : disoriented to person, place, or time [Normal Memory] : ~T memory was ~L impaired [Tenderness] : ~T no ~M abdominal tenderness observed [Distended] : not distended [FreeTextEntry3] : erythematous urethral prolapse

## 2024-10-01 ENCOUNTER — APPOINTMENT (OUTPATIENT)
Dept: GERIATRICS | Facility: CLINIC | Age: 89
End: 2024-10-01
Payer: MEDICARE

## 2024-10-01 DIAGNOSIS — F03.C0 UNSPECIFIED DEMENTIA, SEVERE, WITHOUT BEHAVIORAL DISTURBANCE, PSYCHOTIC DISTURBANCE, MOOD DISTURBANCE, AND ANXIETY: ICD-10-CM

## 2024-10-01 DIAGNOSIS — R13.10 DYSPHAGIA, UNSPECIFIED: ICD-10-CM

## 2024-10-01 DIAGNOSIS — R73.09 OTHER ABNORMAL GLUCOSE: ICD-10-CM

## 2024-10-01 DIAGNOSIS — E03.9 HYPOTHYROIDISM, UNSPECIFIED: ICD-10-CM

## 2024-10-01 DIAGNOSIS — F32.A DEPRESSION, UNSPECIFIED: ICD-10-CM

## 2024-10-01 PROCEDURE — G2211 COMPLEX E/M VISIT ADD ON: CPT

## 2024-10-01 PROCEDURE — 99214 OFFICE O/P EST MOD 30 MIN: CPT

## 2024-10-01 NOTE — REVIEW OF SYSTEMS
[Eyesight Problems] : eyesight problems [Confused] : confusion [Difficulty Walking] : difficulty walking [Negative] : Heme/Lymph

## 2024-10-01 NOTE — PHYSICAL EXAM
[EOMI] : extraocular movements were intact [Normal Outer Ear/Nose] : the ears and nose were normal in appearance [Respiration, Rhythm And Depth] : normal respiratory rhythm and effort [Edema] : edema was not present [Involuntary Movements] : no involuntary movements were seen [de-identified] : Minimally verbal.  Appears alert [de-identified] : Moves all extremities

## 2024-10-01 NOTE — HISTORY OF PRESENT ILLNESS
[Home] : at home, [unfilled] , at the time of the visit. [Medical Office: ([facility])___] : at the medical office located in  [Family Member] : family member [FreeTextEntry3] : Daughter Cheryl Crowder [FreeTextEntry1] : Mrs. Genie Crowder is 89-year-old woman being seen in telehealth visit.  Visit facilitated by her daughter Cheryl.  The patient is homebound, she is a max assist to stand, able to take 3 or 4 steps with a walker and two aides next to her. Patient is dependent in all ADLs.  Appetite is fairly good.  Solids are pureed and liquids are thickened to nectar consistency.  She tolerates current diet well. Per Epifanio, patient's home health aide, patient does not express physical complaints, and has not exhibited any signs or symptoms of depression.  Patient stopped taking her Zoloft. [FreeTextEntry4] : Aide Dorma [No falls in past year] : Patient reported no falls in the past year [Completely Dependent] : Completely dependent. [Walker] : walker [Wheelchair] : wheelchair [Hospital Bed] : hospital bed [Smoke Detector] : smoke detector [Carbon Monoxide Detector] : carbon monoxide detector [Grab Bars] : grab bars [NO] : No [Little interest or pleasure doing things] : 1) Little interest or pleasure doing things [Feeling down, depressed, or hopeless] : 2) Feeling down, depressed, or hopeless [0] : 2) Feeling down, depressed, or hopeless: Not at all (0) [[id number]] : 0

## 2024-10-02 ENCOUNTER — LABORATORY RESULT (OUTPATIENT)
Age: 89
End: 2024-10-02

## 2024-10-02 ENCOUNTER — NON-APPOINTMENT (OUTPATIENT)
Age: 89
End: 2024-10-02

## 2024-10-03 ENCOUNTER — LABORATORY RESULT (OUTPATIENT)
Age: 89
End: 2024-10-03

## 2024-10-28 ENCOUNTER — APPOINTMENT (OUTPATIENT)
Dept: UROGYNECOLOGY | Facility: CLINIC | Age: 89
End: 2024-10-28
Payer: MEDICARE

## 2024-10-28 VITALS
BODY MASS INDEX: 14.99 KG/M2 | SYSTOLIC BLOOD PRESSURE: 149 MMHG | HEART RATE: 106 BPM | DIASTOLIC BLOOD PRESSURE: 86 MMHG | HEIGHT: 65 IN | WEIGHT: 90 LBS

## 2024-10-28 DIAGNOSIS — N39.0 URINARY TRACT INFECTION, SITE NOT SPECIFIED: ICD-10-CM

## 2024-10-28 DIAGNOSIS — N95.2 POSTMENOPAUSAL ATROPHIC VAGINITIS: ICD-10-CM

## 2024-10-28 PROCEDURE — 99459 PELVIC EXAMINATION: CPT

## 2024-10-28 PROCEDURE — 99213 OFFICE O/P EST LOW 20 MIN: CPT

## 2024-11-04 RX ORDER — LEVOTHYROXINE SODIUM 88 UG/1
88 CAPSULE ORAL DAILY
Qty: 90 | Refills: 0 | Status: DISCONTINUED | COMMUNITY
Start: 2024-11-01 | End: 2024-11-04

## 2024-11-04 RX ORDER — LEVOTHYROXINE SODIUM 88 UG/1
88 TABLET ORAL
Qty: 90 | Refills: 1 | Status: ACTIVE | COMMUNITY
Start: 2024-11-04 | End: 1900-01-01

## 2024-12-01 ENCOUNTER — NON-APPOINTMENT (OUTPATIENT)
Age: 89
End: 2024-12-01

## 2024-12-02 DIAGNOSIS — E46 UNSPECIFIED PROTEIN-CALORIE MALNUTRITION: ICD-10-CM

## 2024-12-03 ENCOUNTER — LABORATORY RESULT (OUTPATIENT)
Age: 88
End: 2024-12-03

## 2024-12-03 ENCOUNTER — NON-APPOINTMENT (OUTPATIENT)
Age: 88
End: 2024-12-03

## 2024-12-06 ENCOUNTER — NON-APPOINTMENT (OUTPATIENT)
Age: 88
End: 2024-12-06

## 2024-12-08 ENCOUNTER — NON-APPOINTMENT (OUTPATIENT)
Age: 88
End: 2024-12-08

## 2025-02-03 ENCOUNTER — APPOINTMENT (OUTPATIENT)
Dept: UROGYNECOLOGY | Facility: CLINIC | Age: 89
End: 2025-02-03

## 2025-02-21 NOTE — PROGRESS NOTE ADULT - PROBLEM SELECTOR PLAN 9
C/w home levothyroxine 75mcg  - Check TSH

## 2025-03-15 NOTE — PROGRESS NOTE ADULT - SUBJECTIVE AND OBJECTIVE BOX
atient seen and examined this morning. Pain well controlled overnight. Tolerating sling well. Denies any new numbness, tingling, weakness, paresthesias.     T(C): 37.2 (11-30-22 @ 04:18)  HR: 74 (11-30-22 @ 04:18)  BP: 144/90 (11-30-22 @ 04:18)  RR: 16 (11-30-22 @ 04:18)  SpO2: 95% (11-30-22 @ 04:18)  Wt(kg): --    Gen: NAD  Resp: Unlabored breathing  PE RUE:  Skin intact,    SILT axillary/med/rad/ulnar  +Motor AIN/PIN/Ulnar  +painless elbow/wrist ROM,   shoulder ROM limited 2/2 pain,   Sling in place and adjusted  2+radial pulse, soft compartments.        89yFemale with R proximal humerus fracture  Pain control  NWB RUE in sling  PT/OT  No acute orthopaedic intervention  DVT ppx   PAST MEDICAL HISTORY:  HTN (hypertension)